# Patient Record
Sex: FEMALE | Race: WHITE | Employment: OTHER | URBAN - METROPOLITAN AREA
[De-identification: names, ages, dates, MRNs, and addresses within clinical notes are randomized per-mention and may not be internally consistent; named-entity substitution may affect disease eponyms.]

---

## 2017-05-10 ENCOUNTER — OFFICE VISIT (OUTPATIENT)
Dept: CARDIOTHORACIC SURGERY | Age: 82
End: 2017-05-10

## 2017-05-10 VITALS
RESPIRATION RATE: 16 BRPM | HEART RATE: 79 BPM | HEIGHT: 66 IN | TEMPERATURE: 97.9 F | BODY MASS INDEX: 27.08 KG/M2 | SYSTOLIC BLOOD PRESSURE: 114 MMHG | WEIGHT: 168.5 LBS | OXYGEN SATURATION: 94 % | DIASTOLIC BLOOD PRESSURE: 58 MMHG

## 2017-05-10 DIAGNOSIS — Z95.1 S/P CABG (CORONARY ARTERY BYPASS GRAFT): ICD-10-CM

## 2017-05-10 DIAGNOSIS — E03.9 HYPOTHYROIDISM, UNSPECIFIED TYPE: ICD-10-CM

## 2017-05-10 DIAGNOSIS — I25.10 CAD S/P PERCUTANEOUS CORONARY ANGIOPLASTY: ICD-10-CM

## 2017-05-10 DIAGNOSIS — I10 HYPERTENSION, BENIGN: ICD-10-CM

## 2017-05-10 DIAGNOSIS — Z98.61 CAD S/P PERCUTANEOUS CORONARY ANGIOPLASTY: ICD-10-CM

## 2017-05-10 DIAGNOSIS — I35.0 AORTIC VALVE STENOSIS, UNSPECIFIED ETIOLOGY: Primary | ICD-10-CM

## 2017-05-10 DIAGNOSIS — I25.10 CORONARY ARTERY DISEASE INVOLVING NATIVE HEART WITHOUT ANGINA PECTORIS, UNSPECIFIED VESSEL OR LESION TYPE: ICD-10-CM

## 2017-05-10 RX ORDER — ACETAMINOPHEN 325 MG/1
650 TABLET ORAL
COMMUNITY

## 2017-05-10 RX ORDER — DILTIAZEM HYDROCHLORIDE EXTENDED-RELEASE TABLETS 180 MG/1
180 TABLET, EXTENDED RELEASE ORAL
COMMUNITY

## 2017-05-10 RX ORDER — ROSUVASTATIN CALCIUM 10 MG/1
40 TABLET, COATED ORAL
COMMUNITY

## 2017-05-10 RX ORDER — LEVOTHYROXINE SODIUM 125 UG/1
TABLET ORAL
COMMUNITY

## 2017-05-10 RX ORDER — POTASSIUM CHLORIDE 750 MG/1
20 TABLET, FILM COATED, EXTENDED RELEASE ORAL DAILY
COMMUNITY

## 2017-05-10 RX ORDER — FUROSEMIDE 20 MG/1
TABLET ORAL DAILY
COMMUNITY

## 2017-05-10 RX ORDER — LANOLIN ALCOHOL/MO/W.PET/CERES
500 CREAM (GRAM) TOPICAL
COMMUNITY

## 2017-05-10 RX ORDER — ISOSORBIDE MONONITRATE 30 MG/1
30 TABLET, EXTENDED RELEASE ORAL
COMMUNITY
End: 2017-06-26

## 2017-05-10 RX ORDER — GLUCOSAMINE/CHONDR SU A SOD 750-600 MG
1 TABLET ORAL
COMMUNITY

## 2017-05-10 RX ORDER — GLUCOSAMINE SULFATE 1500 MG
1000 POWDER IN PACKET (EA) ORAL
COMMUNITY

## 2017-05-10 RX ORDER — WARFARIN 7.5 MG/1
5 TABLET ORAL DAILY
COMMUNITY

## 2017-05-10 NOTE — PROGRESS NOTES
Pt seen and examined with Leatha Vidales, NP  Cath and echo reviewed  She has symx AS and a high risk surgical candidate  Will schedule CTA

## 2017-05-10 NOTE — PROGRESS NOTES
Patient: Sravanthi Marquez   Age: 80 y.o. Patient Care Team:  Leonel Layton MD as PCP - General (Internal Medicine)  Rojas Mitchell MD as Referring Provider (Internal Medicine)  Sam Crane MD as Surgeon (Cardiothoracic Surgery)      PCP: Leonel Layton MD    Cardiologist: David Still     Diagnosis/Reason for Consultation: The primary encounter diagnosis was Aortic valve stenosis, unspecified etiology. Diagnoses of Hypertension, benign, Hypothyroidism, unspecified type, Coronary artery disease involving native heart without angina pectoris, unspecified vessel or lesion type, S/P CABG (coronary artery bypass graft), and CAD S/P percutaneous coronary angioplasty were also pertinent to this visit. Problem List:   Patient Active Problem List   Diagnosis Code    Acute MI, subendocardial, initial episode of care ([facility] [address] I21.4    Unstable angina ([facility] [address] I20.0    ASHD (arteriosclerotic heart disease) I25.10    Hypertension, benign I10    ASO (arteriosclerosis obliterans) I70.90    Hypercholesteremia E78.00    Hypothyroidism E03.9    Aortic stenosis, mild I35.0         HPI: 80 y.o.  female with PMHx of AS, rheumatic fever, MI (2012), HTN, HLD, PVD, Afib (warfarin), CAD s/p CABG (1994 by Dr. Sai Nelson) s/p multiple PCI multiple (MARIE to SVG-L Cx 8/2011, MARIE to SVG-Diag graft 10/2014), Carotid stensios s/p R CEA (1996), PAD with intermittent claudication, CVA s/p cerebellar infarcts, Hypothyroid, Nephrolithiasis, recurrent UTIs, past tobacco abuse (smoked for 2 yrs and quit 40 yrs ago) that is referred to the [address] [place] [address] by Dr. David Still for interventional evaluation of her AS. Ms. Leonor Navarro has been followed by Dr. David Still for several years and has most recently has been hospitalized at [facility] in [facility] (4/2017) for CHF, recurrent exertional CP with minimal activity, worsening SOB/HARRISON and dizziness.   She underwent TTE, cardiac cath and was discharged with a BB & an increased diuretic dosing from prn to daily. Previously, Ms. Artemio Christine has been hospitalized for CHF in Jan 2015 and in Feb 2016. Today Ms. Marquez reports persistent SOB/HARRISON with modest activity. She still has occasional exertional CP and arm pain. She also has some general extremity weakness and mild LE edema that she says is improved on her daily diuretic regimen. She denies orthopnea, PND, syncope or falls. Ms. Artemio Christine lives with her daughter and is independent in the ADLs but her daughter assists with medication administration d/t her short term memory issues. NYHA Classification: III   Class III (Moderate): Marked limitation of physical activity. Comfortable at rest, but less than ordinary activity causes fatigue, palpitation, or dyspnea    Angina Classification: 0   Class 0: No symptoms     Past Medical History:   Diagnosis Date    CAD (coronary artery disease)     mi    Heart failure ([facility] [address]     Hypertension     Thyroid disease     hypo     Endocarditis: no  Pulmonary HTN:  yes Greater than 2/3 systemic: no  Immunocompromised/Steroids: no  Heart Failure Admission w/in past year:  yes  Heart Failure Admission w/in past 2 weeks: no  Liver Dz/Cirrhosis: no   If yes, MELD score:  N/A    Past Surgical History:   Procedure Laterality Date    ABDOMEN SURGERY PROC UNLISTED      kidney stone    CARDIAC SURG PROCEDURE UNLIST      cabg    VASCULAR SURGERY PROCEDURE UNLIST      carotiderecctomy      Social History   Substance Use Topics    Smoking status: Never Smoker    Smokeless tobacco: Never Used    Alcohol use No      No family history on file. Prior to Admission medications    Medication Sig Start Date End Date Taking? Authorizing Provider   Biotin 2,500 mcg cap Take 1 Cap by mouth nightly. Yes Historical Provider   cholecalciferol (VITAMIN D3) 1,000 unit cap Take 1,000 Units by mouth nightly.    Yes Historical Provider   cyanocobalamin (VITAMIN B12) 500 mcg tablet Take 500 mcg by mouth nightly. Yes Historical Provider   dilTIAZem ER (CARDIZEM LA) 180 mg Tb24 tablet Take 180 mg by mouth nightly. Yes Historical Provider   isosorbide mononitrate ER (IMDUR) 30 mg tablet Take 30 mg by mouth nightly. Yes Historical Provider   potassium chloride SR (KLOR-CON 10) 10 mEq tablet Take 20 mEq by mouth daily. Yes Historical Provider   acetaminophen (TYLENOL) 325 mg tablet Take 650 mg by mouth every four (4) hours as needed for Pain. Yes Historical Provider   furosemide (LASIX) 20 mg tablet Take  by mouth daily. Yes Historical Provider   levothyroxine (SYNTHROID) 125 mcg tablet Take  by mouth Daily (before breakfast). Yes Historical Provider   rosuvastatin (CRESTOR) 10 mg tablet Take 40 mg by mouth nightly. Yes Historical Provider   warfarin (COUMADIN) 7.5 mg tablet Take 7.5 mg by mouth daily. As directed   Yes Historical Provider   sertraline (ZOLOFT) 50 mg tablet TAKE ONE TABLET BY MOUTH EVERY DAY 12/6/12  Yes Rhonda Agrawal MD   nitroglycerin (NITROSTAT) 0.4 mg SL tablet 1 Tab by SubLINGual route as needed for Chest Pain. 4/10/12  Yes Vivek Dugan MD   aspirin delayed-release 81 mg tablet Take 81 mg by mouth daily. Yes Historical Provider   atenolol (TENORMIN) 25 mg tablet Take 25 mg by mouth daily. Yes Historical Provider       Allergies   Allergen Reactions    Levofloxacin Swelling    Codeine Unable to Obtain     makes patient cry    Contrast Agent [Iodine] Unknown (comments)     Dr. Fabian Starr told patient that she is allergic       Current Medications:   Current Outpatient Prescriptions   Medication Sig Dispense Refill    Biotin 2,500 mcg cap Take 1 Cap by mouth nightly.  cholecalciferol (VITAMIN D3) 1,000 unit cap Take 1,000 Units by mouth nightly.  cyanocobalamin (VITAMIN B12) 500 mcg tablet Take 500 mcg by mouth nightly.  dilTIAZem ER (CARDIZEM LA) 180 mg Tb24 tablet Take 180 mg by mouth nightly.       isosorbide mononitrate ER (IMDUR) 30 mg tablet Take 30 mg by mouth nightly.  potassium chloride SR (KLOR-CON 10) 10 mEq tablet Take 20 mEq by mouth daily.  acetaminophen (TYLENOL) 325 mg tablet Take 650 mg by mouth every four (4) hours as needed for Pain.  furosemide (LASIX) 20 mg tablet Take  by mouth daily.  levothyroxine (SYNTHROID) 125 mcg tablet Take  by mouth Daily (before breakfast).  rosuvastatin (CRESTOR) 10 mg tablet Take 40 mg by mouth nightly.  warfarin (COUMADIN) 7.5 mg tablet Take 7.5 mg by mouth daily. As directed      sertraline (ZOLOFT) 50 mg tablet TAKE ONE TABLET BY MOUTH EVERY DAY 90 Tab 1    nitroglycerin (NITROSTAT) 0.4 mg SL tablet 1 Tab by SubLINGual route as needed for Chest Pain. 1 Bottle prn    aspirin delayed-release 81 mg tablet Take 81 mg by mouth daily.  atenolol (TENORMIN) 25 mg tablet Take 25 mg by mouth daily. Vitals: Blood pressure 114/58, pulse 79, temperature 97.9 °F (36.6 °C), temperature source Oral, resp. rate 16, height 5' 6\" (1.676 m), weight 168 lb 8 oz (76.4 kg), SpO2 94 %. Allergies: is allergic to levofloxacin; codeine; and contrast agent [iodine].     Review of Systems: Pertinent Positives per HPI   [x]Total of 13 systems reviewed as follows:  Constitutional: Negative fever, negative chills  Eyes:   Negative for amauroses fugax  ENT:   Negative sore throat,oral absecess  Endocrine Negative for goiter; DM  Respiratory:  Negative chronic cough,sputum production  Cards:   Negative for palpitations, varicosities  GI:   Negative for dysphagia, bleeding, nausea, vomiting, diarrhea, and abdominal pain  Genitourinary: Negative for frequency, dysuria  Integument:  Negative for rash and pruritus  Hematologic:  Negative for easy bruising; bleeding dyscarsia  Musculoskel: Negative for muscle weakness inhibiting ambulation  Neurological:  Negative for TIA, syncope, dizziness  Behavl/Psych: Negative for feelings of anxiety, depression     Cardiovascular Testing:   EKG: none    TTE 2/8/2017: Severe AS with CAROLYNN 0.86cm2, meanPG 36 mmHg, peakPG 71 mmHg,mild AI, central jet of mod MR, 60% LVEF with akinesis of the basal inferolateral wall, mod pulm HTN, mod TR with estimated PASP 63 mmHg, severely dilated LA    L/R Cardiac catheterization 4/17/2017: 3 vessel CAD; Patent LIMA -> LAD, SVG -> OMB with 80% stenosis, SVG-> RCA with . AS with CAROLYNN 0.87cm2, mildly elevated PCWP, mild pulmonary HTN, RA hypoxia, abdominal aortic aneurysm, mod-severe peripheral dz involving common iliac arteries. Heavy calcification in distal abdominal aorta, iliac arteries and femoral arteries. The infrarenal abdominal aorta has an AAA with lumen diameter approx 4.5 cm. R iliac at least 50% stenosis in the external iliac artery. L common iliac w/ 50% stenosis   Ao Pressure: 125/33 mmHg  LV pressure: 162/13 mmHg  AV gradient 33 mmHg  CO 2.85L/m by thermodilution and 4.7 L/m by Misbah  RAP: 8 mmHg  RV: 38/18 mmHg  PA: 41/12/23  PCWP: 14  SVC oxygenation saturation 56%  PA sat 51%  Aortic oxygen saturation 86%    Physical Exam:  General: Well nourished well groomed elderly woman appearing stated age accompanied by daughter  Neuro: A&OX3. LOPEZ. PERRL. [place]. Slow steady assisted gait with cane  Head:Normocephalic. Atraumatic. Symmetrical  Neck: Trachea Midline  Resp: CTA B. No Adv BS/cough/sputum/tachypnea with seated conversation  CV: S1S2 RRR. RUFUS IV/VI. No JVD/carotid bruits. Pink/warm/dry extremities. Moderate LE peripheral edema  GI:Benign ab. Soft. NT/ND. Active BS  : Voids  Integ: No obvious s/s of infection or breakdown  Musculo/Skeletal: FROM in all major joints. Good muscle tone    Clinic Evaluation:   KCCQ-12: scanned into EMR    5 meter gait: 12.30 seconds    Frality Survey:  Romana Cindy Index ADL - 6/6  scanned into EMR     Assessment/Plan:   1. AS - severe and symptomatic. Appears high risk surgical candidate. Needs Gated C/A/P CTA, PFT, & Carotids to further eval interventional options/approaches.  She has a dye allergy - given prophylax prescriptions. Will set up for TAVR TUesday testing and then back with us for dx review, meet with Amy Alejandra & Modesta and interventional plan. 2. Further plan/care by Amy Springer

## 2017-05-16 ENCOUNTER — HOSPITAL ENCOUNTER (OUTPATIENT)
Age: 82
Discharge: HOME | End: 2017-05-16
Attending: THORACIC SURGERY (CARDIOTHORACIC VASCULAR SURGERY) | Admitting: THORACIC SURGERY (CARDIOTHORACIC VASCULAR SURGERY)
Payer: MEDICARE

## 2017-05-16 ENCOUNTER — APPOINTMENT (OUTPATIENT)
Dept: PULMONOLOGY | Age: 82
End: 2017-05-16
Attending: NURSE PRACTITIONER
Payer: MEDICARE

## 2017-05-16 ENCOUNTER — APPOINTMENT (OUTPATIENT)
Dept: CT IMAGING | Age: 82
End: 2017-05-16
Attending: NURSE PRACTITIONER
Payer: MEDICARE

## 2017-05-16 ENCOUNTER — APPOINTMENT (OUTPATIENT)
Dept: VASCULAR SURGERY | Age: 82
End: 2017-05-16
Attending: NURSE PRACTITIONER
Payer: MEDICARE

## 2017-05-16 VITALS
OXYGEN SATURATION: 96 % | HEART RATE: 87 BPM | DIASTOLIC BLOOD PRESSURE: 73 MMHG | SYSTOLIC BLOOD PRESSURE: 128 MMHG | TEMPERATURE: 97.6 F | WEIGHT: 165.79 LBS | HEIGHT: 66 IN | BODY MASS INDEX: 26.64 KG/M2 | RESPIRATION RATE: 15 BRPM

## 2017-05-16 DIAGNOSIS — Z98.61 CAD S/P PERCUTANEOUS CORONARY ANGIOPLASTY: ICD-10-CM

## 2017-05-16 DIAGNOSIS — I25.10 CAD S/P PERCUTANEOUS CORONARY ANGIOPLASTY: ICD-10-CM

## 2017-05-16 DIAGNOSIS — I25.10 CORONARY ARTERY DISEASE INVOLVING NATIVE HEART WITHOUT ANGINA PECTORIS, UNSPECIFIED VESSEL OR LESION TYPE: ICD-10-CM

## 2017-05-16 DIAGNOSIS — I10 HYPERTENSION, BENIGN: ICD-10-CM

## 2017-05-16 DIAGNOSIS — I35.0 AORTIC VALVE STENOSIS, UNSPECIFIED ETIOLOGY: ICD-10-CM

## 2017-05-16 DIAGNOSIS — Z95.1 S/P CABG (CORONARY ARTERY BYPASS GRAFT): ICD-10-CM

## 2017-05-16 DIAGNOSIS — E03.9 HYPOTHYROIDISM, UNSPECIFIED TYPE: ICD-10-CM

## 2017-05-16 LAB
ALBUMIN SERPL BCP-MCNC: 4.1 G/DL (ref 3.5–5)
ALBUMIN/GLOB SERPL: 1.2 {RATIO} (ref 1.1–2.2)
ALP SERPL-CCNC: 90 U/L (ref 45–117)
ALT SERPL-CCNC: 23 U/L (ref 12–78)
ANION GAP BLD CALC-SCNC: 10 MMOL/L (ref 5–15)
AST SERPL W P-5'-P-CCNC: 19 U/L (ref 15–37)
BASOPHILS # BLD AUTO: 0 K/UL (ref 0–0.1)
BASOPHILS # BLD: 0 % (ref 0–1)
BILIRUB SERPL-MCNC: 0.6 MG/DL (ref 0.2–1)
BUN SERPL-MCNC: 33 MG/DL (ref 6–20)
BUN/CREAT SERPL: 30 (ref 12–20)
CALCIUM SERPL-MCNC: 9.7 MG/DL (ref 8.5–10.1)
CHLORIDE SERPL-SCNC: 104 MMOL/L (ref 97–108)
CO2 SERPL-SCNC: 26 MMOL/L (ref 21–32)
CREAT SERPL-MCNC: 1.11 MG/DL (ref 0.55–1.02)
DIFFERENTIAL METHOD BLD: ABNORMAL
EOSINOPHIL # BLD: 0 K/UL (ref 0–0.4)
EOSINOPHIL NFR BLD: 0 % (ref 0–7)
ERYTHROCYTE [DISTWIDTH] IN BLOOD BY AUTOMATED COUNT: 17.6 % (ref 11.5–14.5)
GLOBULIN SER CALC-MCNC: 3.4 G/DL (ref 2–4)
GLUCOSE SERPL-MCNC: 169 MG/DL (ref 65–100)
HCT VFR BLD AUTO: 38.8 % (ref 35–47)
HGB BLD-MCNC: 12.5 G/DL (ref 11.5–16)
LYMPHOCYTES # BLD AUTO: 13 % (ref 12–49)
LYMPHOCYTES # BLD: 0.7 K/UL (ref 0.8–3.5)
MCH RBC QN AUTO: 25.8 PG (ref 26–34)
MCHC RBC AUTO-ENTMCNC: 32.2 G/DL (ref 30–36.5)
MCV RBC AUTO: 80 FL (ref 80–99)
MONOCYTES # BLD: 0.1 K/UL (ref 0–1)
MONOCYTES NFR BLD AUTO: 1 % (ref 5–13)
NEUTS SEG # BLD: 4.4 K/UL (ref 1.8–8)
NEUTS SEG NFR BLD AUTO: 86 % (ref 32–75)
PLATELET # BLD AUTO: 152 K/UL (ref 150–400)
POTASSIUM SERPL-SCNC: 3.9 MMOL/L (ref 3.5–5.1)
PROT SERPL-MCNC: 7.5 G/DL (ref 6.4–8.2)
RBC # BLD AUTO: 4.85 M/UL (ref 3.8–5.2)
RBC MORPH BLD: ABNORMAL
SODIUM SERPL-SCNC: 140 MMOL/L (ref 136–145)
WBC # BLD AUTO: 5.2 K/UL (ref 3.6–11)

## 2017-05-16 PROCEDURE — 74011250637 HC RX REV CODE- 250/637: Performed by: NURSE PRACTITIONER

## 2017-05-16 PROCEDURE — 74011636320 HC RX REV CODE- 636/320: Performed by: THORACIC SURGERY (CARDIOTHORACIC VASCULAR SURGERY)

## 2017-05-16 PROCEDURE — 71275 CT ANGIOGRAPHY CHEST: CPT

## 2017-05-16 PROCEDURE — 94010 BREATHING CAPACITY TEST: CPT

## 2017-05-16 PROCEDURE — 36415 COLL VENOUS BLD VENIPUNCTURE: CPT | Performed by: NURSE PRACTITIONER

## 2017-05-16 PROCEDURE — 85025 COMPLETE CBC W/AUTO DIFF WBC: CPT | Performed by: NURSE PRACTITIONER

## 2017-05-16 PROCEDURE — 93880 EXTRACRANIAL BILAT STUDY: CPT

## 2017-05-16 PROCEDURE — 99218 HC RM OBSERVATION: CPT

## 2017-05-16 PROCEDURE — 80053 COMPREHEN METABOLIC PANEL: CPT | Performed by: NURSE PRACTITIONER

## 2017-05-16 PROCEDURE — 74174 CTA ABD&PLVS W/CONTRAST: CPT

## 2017-05-16 RX ORDER — SODIUM CHLORIDE 0.9 % (FLUSH) 0.9 %
SYRINGE (ML) INJECTION
Status: DISCONTINUED
Start: 2017-05-16 | End: 2017-05-16 | Stop reason: HOSPADM

## 2017-05-16 RX ORDER — SODIUM CHLORIDE 0.9 % (FLUSH) 0.9 %
10 SYRINGE (ML) INJECTION
Status: DISCONTINUED | OUTPATIENT
Start: 2017-05-16 | End: 2017-05-16 | Stop reason: HOSPADM

## 2017-05-16 RX ORDER — DIPHENHYDRAMINE HCL 25 MG
50 CAPSULE ORAL ONCE
Status: COMPLETED | OUTPATIENT
Start: 2017-05-16 | End: 2017-05-16

## 2017-05-16 RX ORDER — SODIUM CHLORIDE 9 MG/ML
INJECTION, SOLUTION INTRAVENOUS
Status: DISCONTINUED
Start: 2017-05-16 | End: 2017-05-16 | Stop reason: HOSPADM

## 2017-05-16 RX ORDER — SODIUM CHLORIDE 0.9 % (FLUSH) 0.9 %
10 SYRINGE (ML) INJECTION
Status: DISCONTINUED | OUTPATIENT
Start: 2017-05-16 | End: 2017-05-16 | Stop reason: SDUPTHER

## 2017-05-16 RX ADMIN — IOPAMIDOL 100 ML: 755 INJECTION, SOLUTION INTRAVENOUS at 12:21

## 2017-05-16 RX ADMIN — DIPHENHYDRAMINE HYDROCHLORIDE 50 MG: 25 CAPSULE ORAL at 11:05

## 2017-05-16 NOTE — PROGRESS NOTES
0900: pt arrived to [facility] with daughter. vss and wt obtained. 5585: iv placed and labs sent. 3006: off floor to testing.  1305: pt returned to unit all testing completed. Iv discontinued.

## 2017-05-16 NOTE — PROCEDURES
[facility]  *** FINAL REPORT ***    Name: Trinity Kang  MRN: [id number]    Outpatient  : 01 Dec 1932  HIS Order #: [id number]  [address] [facility] Visit #: [id number]  Date: 16 May 2017    TYPE OF TEST: Cerebrovascular Duplex    REASON FOR TEST  Pre-op Tavr    Right Carotid:-             Proximal               Mid                 Distal  cm/s  Systolic  Diastolic  Systolic  Diastolic  Systolic  Diastolic  CCA:     26.0       9.0                            57.0       8.0  Bulb:  ECA:    100.0       0.0  ICA:     63.0      14.0                            67.0      10.0  ICA/CCA:  1.1       1.8    ICA Stenosis: <50%    Right Vertebral:-  Finding: Antegrade  Sys:       43.0  Imani:        9.0    Right Subclavian:    Left Carotid:-            Proximal                Mid                 Distal  cm/s  Systolic  Diastolic  Systolic  Diastolic  Systolic  Diastolic  CCA:     83.3       7.0                            61.0      10.0  Bulb:  ECA:    161.0       0.0  ICA:    155.0      35.0                            54.0      15.0  ICA/CCA:  2.5       3.5    ICA Stenosis: 50-69%    Left Vertebral:-  Finding: Antegrade  Sys:       41.0  Imani:        0.0    Left Subclavian:    INTERPRETATION/FINDINGS  PROCEDURE:  Color duplex ultrasound imaging of extracranial  cerebrovascular arteries. FINDINGS:       Right:  Internal carotid velocity is within normal limits. There   is narrowing of the flow channel on color Doppler imaging and mixed  density plaque on B-mode imaging, consistent with less than 50 percent   stenosis. The common and external carotid arteries are patent and  without evidence of hemodynamically significant stenosis. Left:  Internal carotid velocity is elevated to a level  consistent with a 50 to 69 percent stenosis; there is narrowing of the   flow channel on color Doppler imaging and mixed density plaque on  B-mode imaging.   The common carotid artery is patent without evidence  of hemodynamically significant stenosis. The external carotid artery  is narrowed with evidence of hemodynamically significant stenosis. IMPRESSION:  Consistent with less than 50% stenosis of the right  internal carotid and 50-69% stenosis of the left internal carotid. Vertebrals are patent with antegrade flow. ADDITIONAL COMMENTS    I have personally reviewed the data relevant to the interpretation of  this  study.     TECHNOLOGIST: DEISY Ruiz RCS  Signed: 05/16/2017 12:31 PM    PHYSICIAN: Adele Silver MD  Signed: 05/17/2017 08:18 AM

## 2017-05-17 NOTE — PROCEDURES
[address]   [address] [facility], [address] [place] [address]   PULMONARY FUNCTION       Name:  Marcell Duong   MR#:  [id number]   :  1932   Account #:  [de-identified]    Date of Procedure:  2017   Date of Adm:  2017       REQUESTING PHYSICIAN: Dr. Marisel Reyes. INDICATION: Aortic valve disease, I35.0.      Spirometry reveals a normal vital capacity, normal FEV1, and a normal   ratio and flow loops are normal.         MD Sheron Wyatt / Carmel Larios   D:  2017   09:50   T:  2017   11:04   Job #:  [id number]

## 2017-05-31 ENCOUNTER — OFFICE VISIT (OUTPATIENT)
Dept: CARDIOTHORACIC SURGERY | Age: 82
End: 2017-05-31

## 2017-05-31 VITALS
BODY MASS INDEX: 27.08 KG/M2 | HEIGHT: 66 IN | OXYGEN SATURATION: 95 % | WEIGHT: 168.5 LBS | RESPIRATION RATE: 16 BRPM | DIASTOLIC BLOOD PRESSURE: 62 MMHG | HEART RATE: 74 BPM | SYSTOLIC BLOOD PRESSURE: 108 MMHG | TEMPERATURE: 98.1 F

## 2017-05-31 DIAGNOSIS — E03.9 HYPOTHYROIDISM, UNSPECIFIED TYPE: ICD-10-CM

## 2017-05-31 DIAGNOSIS — I35.0 AORTIC VALVE STENOSIS, UNSPECIFIED ETIOLOGY: Primary | ICD-10-CM

## 2017-05-31 DIAGNOSIS — I25.10 CORONARY ARTERY DISEASE INVOLVING NATIVE HEART WITHOUT ANGINA PECTORIS, UNSPECIFIED VESSEL OR LESION TYPE: ICD-10-CM

## 2017-05-31 DIAGNOSIS — Z98.61 CAD S/P PERCUTANEOUS CORONARY ANGIOPLASTY: ICD-10-CM

## 2017-05-31 DIAGNOSIS — Z95.1 S/P CABG (CORONARY ARTERY BYPASS GRAFT): ICD-10-CM

## 2017-05-31 DIAGNOSIS — I25.10 CAD S/P PERCUTANEOUS CORONARY ANGIOPLASTY: ICD-10-CM

## 2017-05-31 DIAGNOSIS — I10 HYPERTENSION, BENIGN: ICD-10-CM

## 2017-05-31 RX ORDER — CEPHALEXIN 500 MG/1
500 CAPSULE ORAL 2 TIMES DAILY
COMMUNITY
End: 2017-06-07 | Stop reason: SDUPTHER

## 2017-05-31 NOTE — PROGRESS NOTES
Patient: Franco Swain   Age: 80 y.o. Patient Care Team:  Jada Murillo MD as PCP - General (Internal Medicine)  Ashanti Siegel MD as Referring Provider (Internal Medicine)  Kiel Owen MD as Surgeon (Cardiothoracic Surgery)      PCP: Jada Murillo MD    Cardiologist: Claudene Nay    Diagnosis/Reason for Consultation: The primary encounter diagnosis was Aortic valve stenosis, unspecified etiology. Diagnoses of Hypertension, benign, Hypothyroidism, unspecified type, Coronary artery disease involving native heart without angina pectoris, unspecified vessel or lesion type, S/P CABG (coronary artery bypass graft), and CAD S/P percutaneous coronary angioplasty were also pertinent to this visit. Problem List:   Patient Active Problem List   Diagnosis Code    Acute MI, subendocardial, initial episode of care ([facility] [address] I21.4    Unstable angina ([facility] [address] I20.0    ASHD (arteriosclerotic heart disease) I25.10    Hypertension, benign I10    ASO (arteriosclerosis obliterans) I70.90    Hypercholesteremia E78.00    Hypothyroidism E03.9    Aortic stenosis, mild I35.0      HPI: 80 y.o.  female with PMHx of AS, rheumatic fever, MI (2012), HTN, HLD, PVD, Afib (warfarin), CAD s/p CABG (1994 by Dr. Rachel Calderon) s/p multiple PCI multiple (MARIE to SVG-L Cx 8/2011, MARIE to SVG-Diag graft 10/2014), Carotid stensios s/p R CEA (1996), PAD with intermittent claudication, s/p L subclavian stent, CVA s/p cerebellar infarcts, Hypothyroid, Nephrolithiasis, recurrent UTIs, past tobacco abuse (smoked for 2 yrs and quit 40 yrs ago) that was referred to the [address] [place] [address] by Dr. Claudene Nay for interventional evaluation of her AS. She is back today to review her dx testing and discuss interventional options.     Ms. Marquez has been followed by Dr. Claudene Nay for several years and has most recently has been hospitalized at [place] in [place] (4/2017) for CHF, recurrent exertional CP with minimal activity, worsening SOB/HARRISON and dizziness. She underwent TTE, cardiac cath and was discharged with a BB & an increased diuretic dosing from prn to daily. Previously, Ms. Alvina Stapleton has been hospitalized for CHF in Jan 2015 and in Feb 2016.      Today Ms. Marquez reports persistent SOB/HARRISON with modest activity. Denies CP. Lightheadedness most all of the time since first cardiac surgery. No syncope. Last fall 3/28/2017 with fast turn. No LOC. LE edema improved on daily diuretic. Three pillows for orthopnea. No PND. Wakes 2-3 times/nightly to urinate.      Ms. Marquez lives with her daughter and is independent in the ADLs but her daughter assists with medication administration d/t her short term memory issues. She also uses a cane to assist with mobility.     NYHA Classification: III  Class III (Moderate): Marked limitation of physical activity. Comfortable at rest, but less than ordinary activity causes fatigue, palpitation, or dyspnea     Angina Classification: 0   Class 0: No symptoms    Past Medical History:   Diagnosis Date    CAD (coronary artery disease)     mi    Heart failure ([facility] [address]     Hypertension     Thyroid disease     hypo     Endocarditis: no  Pulmonary HTN: yes  Greater than 2/3 systemic: no  Immunocompromised/Steroids: no  Heart Failure Admission w/in past year:  yes  Heart Failure Admission w/in past 2 weeks: no  Liver Dz/Cirrhosis: no If yes, MELD score: N/A    Past Surgical History:   Procedure Laterality Date    ABDOMEN SURGERY PROC UNLISTED      kidney stone    CARDIAC SURG PROCEDURE UNLIST      cabg    VASCULAR SURGERY PROCEDURE UNLIST      carotiderecctomy      Social History   Substance Use Topics    Smoking status: Never Smoker    Smokeless tobacco: Never Used    Alcohol use No      No family history on file. Prior to Admission medications    Medication Sig Start Date End Date Taking? Authorizing Provider   cephALEXin (KEFLEX) 500 mg capsule Take 500 mg by mouth two (2) times a day. Indications: BACTERIAL URINARY TRACT INFECTION   Yes Historical Provider   Biotin 2,500 mcg cap Take 1 Cap by mouth nightly. Yes Historical Provider   cholecalciferol (VITAMIN D3) 1,000 unit cap Take 1,000 Units by mouth nightly. Yes Historical Provider   cyanocobalamin (VITAMIN B12) 500 mcg tablet Take 500 mcg by mouth nightly. Yes Historical Provider   dilTIAZem ER (CARDIZEM LA) 180 mg Tb24 tablet Take 180 mg by mouth nightly. Yes Historical Provider   isosorbide mononitrate ER (IMDUR) 30 mg tablet Take 30 mg by mouth nightly. Yes Historical Provider   potassium chloride SR (KLOR-CON 10) 10 mEq tablet Take 20 mEq by mouth daily. Yes Historical Provider   acetaminophen (TYLENOL) 325 mg tablet Take 650 mg by mouth every four (4) hours as needed for Pain. Yes Historical Provider   furosemide (LASIX) 20 mg tablet Take  by mouth daily. Yes Historical Provider   levothyroxine (SYNTHROID) 125 mcg tablet Take  by mouth Daily (before breakfast). Yes Historical Provider   rosuvastatin (CRESTOR) 10 mg tablet Take 40 mg by mouth nightly. Yes Historical Provider   warfarin (COUMADIN) 7.5 mg tablet Take 7.5 mg by mouth daily. As directed   Yes Historical Provider   sertraline (ZOLOFT) 50 mg tablet TAKE ONE TABLET BY MOUTH EVERY DAY 12/6/12  Yes Sage Solomon MD   nitroglycerin (NITROSTAT) 0.4 mg SL tablet 1 Tab by SubLINGual route as needed for Chest Pain. 4/10/12  Yes Vince Dewey MD   aspirin delayed-release 81 mg tablet Take 81 mg by mouth daily. Yes Historical Provider   atenolol (TENORMIN) 25 mg tablet Take 25 mg by mouth daily.    Yes Historical Provider       Allergies   Allergen Reactions    Levofloxacin Swelling    Codeine Unable to Obtain     makes patient cry    Contrast Agent [Iodine] Unknown (comments)     Dr. Harry Whipple told patient that she is allergic       Current Medications:   Current Outpatient Prescriptions   Medication Sig Dispense Refill    cephALEXin (KEFLEX) 500 mg capsule Take 500 mg by mouth two (2) times a day. Indications: BACTERIAL URINARY TRACT INFECTION      Biotin 2,500 mcg cap Take 1 Cap by mouth nightly.  cholecalciferol (VITAMIN D3) 1,000 unit cap Take 1,000 Units by mouth nightly.  cyanocobalamin (VITAMIN B12) 500 mcg tablet Take 500 mcg by mouth nightly.  dilTIAZem ER (CARDIZEM LA) 180 mg Tb24 tablet Take 180 mg by mouth nightly.  isosorbide mononitrate ER (IMDUR) 30 mg tablet Take 30 mg by mouth nightly.  potassium chloride SR (KLOR-CON 10) 10 mEq tablet Take 20 mEq by mouth daily.  acetaminophen (TYLENOL) 325 mg tablet Take 650 mg by mouth every four (4) hours as needed for Pain.  furosemide (LASIX) 20 mg tablet Take  by mouth daily.  levothyroxine (SYNTHROID) 125 mcg tablet Take  by mouth Daily (before breakfast).  rosuvastatin (CRESTOR) 10 mg tablet Take 40 mg by mouth nightly.  warfarin (COUMADIN) 7.5 mg tablet Take 7.5 mg by mouth daily. As directed      sertraline (ZOLOFT) 50 mg tablet TAKE ONE TABLET BY MOUTH EVERY DAY 90 Tab 1    nitroglycerin (NITROSTAT) 0.4 mg SL tablet 1 Tab by SubLINGual route as needed for Chest Pain. 1 Bottle prn    aspirin delayed-release 81 mg tablet Take 81 mg by mouth daily.  atenolol (TENORMIN) 25 mg tablet Take 25 mg by mouth daily. Vitals: Blood pressure 108/62, pulse 74, temperature 98.1 °F (36.7 °C), temperature source Oral, resp. rate 16, height 5' 6\" (1.676 m), weight 168 lb 8 oz (76.4 kg), SpO2 95 %. Allergies: is allergic to levofloxacin; codeine; and contrast agent [iodine].     Review of Systems: Pertinent Positives per HPI  [x]Total of 13 systems reviewed as follows:  Constitutional: Negative fever, negative chills  Eyes:   Negative for amauroses fugax  ENT:   Negative sore throat,oral absecess  Endocrine Negative for goiter; DM  Respiratory:  Negative chronic cough,sputum production  Cards:   Negative for palpitations, varicosities  GI:   Negative for dysphagia, bleeding, nausea, vomiting, diarrhea, and abdominal pain  Genitourinary: Negative for frequency, dysuria  Integument:  Negative for rash and pruritus  Hematologic:  Negative for easy bruising; bleeding dyscarsia  Musculoskel: Negative for muscle weakness inhibiting ambulation  Neurological:  Negative for TIA, syncope, dizziness  Behavl/Psych: Negative for feelings of anxiety, depression      Cardiovascular Testing:   EKG today: Afib 82 bpm. Diffuse ST depressin     TTE 2/8/2017: Severe AS with CAROLYNN 0.86cm2, meanPG 36 mmHg, peakPG 71 mmHg,mild AI, central jet of mod MR, 60% LVEF with akinesis of the basal inferolateral wall, mod pulm HTN, mod TR with estimated PASP 63 mmHg, severely dilated LA     L/R Cardiac catheterization 4/17/2017: 3 vessel CAD; Patent LIMA -> LAD, SVG -> OMB with 80% stenosis, SVG-> RCA with . AS with CAROLYNN 0.87cm2, mildly elevated PCWP, mild pulmonary HTN, RA hypoxia, abdominal aortic aneurysm, mod-severe peripheral dz involving common iliac arteries. Heavy calcification in distal abdominal aorta, iliac arteries and femoral arteries. The infrarenal abdominal aorta has an AAA with lumen diameter approx 4.5 cm. R iliac at least 50% stenosis in the external iliac artery. L common iliac w/ 50% stenosis   Ao Pressure: 125/33 mmHg   LV pressure: 162/13 mmHg  AV gradient 33 mmHg  CO 2.85L/m by thermodilution and 4.7 L/m by Misbah  RAP: 8 mmHg  RV: 38/18 mmHg  PA: 41/12/23  PCWP: 14   SVC oxygenation saturation 56%  PA sat 51%  Aortic oxygen saturation 86%    Carotid Dopplers 5/16/2017: Consistent with less than 50% stenosis of the right internal carotid and 50-69% stenosis of the left internal carotid. Vertebrals are patent with antegrade flow. PFTs: FEV1/Predicted 5/16/2017: 2.21L / 120% predicted   Normal FEV1 > 1 Liter, Predicted = 100%, DLCO > 80%     CTA 5/16/2017: THYROID: No nodule.  MEDIASTINUM: Prominent mediastinal lymph nodes are noted, with a reference 19 mm precarinal lymph node. JELLY: No mass or lymphadenopathy. THORACIC AORTA: The thoracic aorta is atherosclerotic and appearance without evidence of aneurysm. The patient is status post CABG procedure. Aortic valvular calcification is noted. The annulus measures 2.6 cm and the sinus measures 3.1 cm. There is a stent within the proximal left subclavian artery. MAIN PULMONARY ARTERY: Normal in caliber. TRACHEA/BRONCHI: Patent. ESOPHAGUS: No wall thickening or dilatation. HEART: Normal in size. PLEURA: There is a small right pleural effusion. LUNGS: Scarring is noted in the lung apices. Multiple calcified granulomata are noted. No acute abnormality is identified.   LIVER: Multiple hepatic cysts are noted, with a reference 3.3 cm cyst in the left hepatic lobe with septations. GALLBLADDER: Unremarkable. SPLEEN: The spleen is at the upper limits of normal in size measuring 2.4 cm. PANCREAS: There is an 11 mm lipoma in the tail of the pancreas. ADRENALS: Unremarkable. KIDNEYS: There is a 14 mm nonobstructing left renal stone. No renal mass or hydronephrosis. STOMACH: Unremarkable. SMALL BOWEL: No dilatation or wall thickening. COLON: No dilatation or wall thickening. APPENDIX: Unremarkable. PERITONEUM: No ascites or pneumoperitoneum. RETROPERITONEUM: There is a 4.2 cm infrarenal abdominal aortic aneurysm. The iliac vasculature is heavily calcified. The right common iliac artery measures 10 mm and the left measures 9. Common femoral arteries are also heavily calcified and measures 7 mm each. REPRODUCTIVE ORGANS: There is no pelvic mass or lymphadenopathy. URINARY BLADDER: No mass or calculus. BONES: Degenerative changes are seen in the thoracic and lumbar spine. IMPRESSION:  1. Granulomatous disease. 2. Prominent mediastinal lymph nodes. Attention on follow-up is recommended. 3. Extensive atherosclerotic vascular disease as described above. 4. Hepatic cysts including complex cyst in the left hepatic lobe.   5. 4.2 cm infrarenal abdominal aortic aneurysm.     Physical Exam:  General: Well nourished well groomed elderly woman appearing stated age accompanied by daughter  Neuro: A&OX3. LOPEZ. PERRL. [place]. Slow steady assisted gait with cane  Head:Normocephalic. Atraumatic. Symmetrical  Neck: Trachea Midline  Resp: CTA B. No Adv BS/cough/sputum/tachypnea with seated conversation  CV: S1S2 RRR. RUFUS IV/VI. No JVD/carotid bruits. Pink/warm/dry extremities. Moderate LE peripheral edema  GI:Benign ab. Soft. NT/ND. Active BS  : Voids  Integ: No obvious s/s of infection or breakdown  Musculo/Skeletal: FROM in all major joints. Good muscle tone     Clinic Evaluation:   KCCQ-12: scanned into EMR at previous visit     5 meter gait: 12.30 seconds     Frality Survey:  Teagan Pique Index ADL - 6/6 scanned into EMR at previous visit    STS 2.81 Risk Score / Predicted 30 day mortality: - calculations scanned into EMR   Procedure: AV Replacement    Risk of Mortality: 5.306%    Morbidity or Mortality: 29.532%    Long Length of Stay: 16.842%    Short Length of Stay: 11.074%    Permanent Stroke: 6.341%    Prolonged Ventilation: 19.366%    DSW Infection: 0.239%    Renal Failure: 6.59%    Reoperation: 11.353%    Assessment/Plan:   1. AS - severe and symptomatic. High risk surgical candidate with redo sternotomy,h/o CVA, mobility issues, porcelain aorta, & vasculopath. TA 23mm S3. Needs contrast allergy prophylax prescription meds to be given at time of H&P.  2. Further plan/care by Amy Garza 238

## 2017-05-31 NOTE — PROGRESS NOTES
Saw patient. History and films reviewed. Risks and benefits explained. Agrees with TAVR. Patient seen by valve clinic NP and agree with her assessment.    Findings/Conditions: AS  Plan of Care: TAVR through trans-apical approach   Risk of morbidity and mortality - high  Medical decision making - high complexity

## 2017-05-31 NOTE — PROGRESS NOTES
I had the pleasure of seeing Ms. Marquez in the valve clinic earlier today with Ms. David Babcock NP - please see her note for details. She has severe symptomatic AS and is at high surgical risk. Her iliac vessels are not adequate for TF access, but would be a good TA candidate with a 23mm S3 valve (annulus 421mm2).

## 2017-06-07 ENCOUNTER — HOSPITAL ENCOUNTER (OUTPATIENT)
Dept: PREADMISSION TESTING | Age: 82
Discharge: HOME | End: 2017-06-07
Payer: MEDICARE

## 2017-06-07 ENCOUNTER — HOSPITAL ENCOUNTER (OUTPATIENT)
Dept: GENERAL RADIOLOGY | Age: 82
Discharge: HOME | End: 2017-06-07
Attending: NURSE PRACTITIONER
Payer: MEDICARE

## 2017-06-07 VITALS
SYSTOLIC BLOOD PRESSURE: 137 MMHG | DIASTOLIC BLOOD PRESSURE: 65 MMHG | WEIGHT: 168.65 LBS | BODY MASS INDEX: 27.22 KG/M2

## 2017-06-07 DIAGNOSIS — I35.0 NONRHEUMATIC AORTIC VALVE STENOSIS: Primary | ICD-10-CM

## 2017-06-07 LAB
ALBUMIN SERPL BCP-MCNC: 3.9 G/DL (ref 3.5–5)
ALBUMIN/GLOB SERPL: 1.1 {RATIO} (ref 1.1–2.2)
ALP SERPL-CCNC: 87 U/L (ref 45–117)
ALT SERPL-CCNC: 31 U/L (ref 12–78)
ANION GAP BLD CALC-SCNC: 7 MMOL/L (ref 5–15)
APPEARANCE UR: CLEAR
APTT PPP: 29.6 SEC (ref 22.1–32.5)
ARTERIAL PATENCY WRIST A: YES
AST SERPL W P-5'-P-CCNC: 20 U/L (ref 15–37)
ATRIAL RATE: 72 BPM
BACTERIA URNS QL MICRO: NEGATIVE /HPF
BASE EXCESS BLD CALC-SCNC: 0 MMOL/L
BASOPHILS # BLD AUTO: 0.1 K/UL (ref 0–0.1)
BASOPHILS # BLD: 1 % (ref 0–1)
BDY SITE: ABNORMAL
BILIRUB SERPL-MCNC: 0.7 MG/DL (ref 0.2–1)
BILIRUB UR QL: NEGATIVE
BUN SERPL-MCNC: 24 MG/DL (ref 6–20)
BUN/CREAT SERPL: 23 (ref 12–20)
CALCIUM SERPL-MCNC: 9.1 MG/DL (ref 8.5–10.1)
CALCULATED R AXIS, ECG10: 19 DEGREES
CALCULATED T AXIS, ECG11: -119 DEGREES
CHLORIDE SERPL-SCNC: 107 MMOL/L (ref 97–108)
CO2 SERPL-SCNC: 29 MMOL/L (ref 21–32)
COLOR UR: ABNORMAL
CREAT SERPL-MCNC: 1.04 MG/DL (ref 0.55–1.02)
DIAGNOSIS, 93000: NORMAL
DIFFERENTIAL METHOD BLD: ABNORMAL
EOSINOPHIL # BLD: 0.2 K/UL (ref 0–0.4)
EOSINOPHIL NFR BLD: 4 % (ref 0–7)
EPITH CASTS URNS QL MICRO: ABNORMAL /LPF
ERYTHROCYTE [DISTWIDTH] IN BLOOD BY AUTOMATED COUNT: 17.7 % (ref 11.5–14.5)
EST. AVERAGE GLUCOSE BLD GHB EST-MCNC: 123 MG/DL
GAS FLOW.O2 O2 DELIVERY SYS: ABNORMAL L/MIN
GLOBULIN SER CALC-MCNC: 3.4 G/DL (ref 2–4)
GLUCOSE SERPL-MCNC: 74 MG/DL (ref 65–100)
GLUCOSE UR STRIP.AUTO-MCNC: NEGATIVE MG/DL
HBA1C MFR BLD: 5.9 % (ref 4.2–6.3)
HCO3 BLD-SCNC: 23 MMOL/L (ref 22–26)
HCT VFR BLD AUTO: 36.9 % (ref 35–47)
HGB BLD-MCNC: 12 G/DL (ref 11.5–16)
HGB UR QL STRIP: ABNORMAL
HYALINE CASTS URNS QL MICRO: ABNORMAL /LPF (ref 0–5)
INR PPP: 1.3 (ref 0.9–1.1)
KETONES UR QL STRIP.AUTO: NEGATIVE MG/DL
LEUKOCYTE ESTERASE UR QL STRIP.AUTO: ABNORMAL
LYMPHOCYTES # BLD AUTO: 15 % (ref 12–49)
LYMPHOCYTES # BLD: 0.8 K/UL (ref 0.8–3.5)
MAGNESIUM SERPL-MCNC: 2.3 MG/DL (ref 1.6–2.4)
MCH RBC QN AUTO: 26.2 PG (ref 26–34)
MCHC RBC AUTO-ENTMCNC: 32.5 G/DL (ref 30–36.5)
MCV RBC AUTO: 80.6 FL (ref 80–99)
MONOCYTES # BLD: 0.4 K/UL (ref 0–1)
MONOCYTES NFR BLD AUTO: 8 % (ref 5–13)
NEUTS SEG # BLD: 3.6 K/UL (ref 1.8–8)
NEUTS SEG NFR BLD AUTO: 72 % (ref 32–75)
NITRITE UR QL STRIP.AUTO: NEGATIVE
PCO2 BLD: 30.6 MMHG (ref 35–45)
PH BLD: 7.49 [PH] (ref 7.35–7.45)
PH UR STRIP: 6 [PH] (ref 5–8)
PLATELET # BLD AUTO: 122 K/UL (ref 150–400)
PO2 BLD: 73 MMHG (ref 80–100)
POTASSIUM SERPL-SCNC: 3.3 MMOL/L (ref 3.5–5.1)
PROT SERPL-MCNC: 7.3 G/DL (ref 6.4–8.2)
PROT UR STRIP-MCNC: NEGATIVE MG/DL
PROTHROMBIN TIME: 12.9 SEC (ref 9–11.1)
Q-T INTERVAL, ECG07: 450 MS
QRS DURATION, ECG06: 118 MS
QTC CALCULATION (BEZET), ECG08: 495 MS
RBC # BLD AUTO: 4.58 M/UL (ref 3.8–5.2)
RBC #/AREA URNS HPF: ABNORMAL /HPF (ref 0–5)
RBC MORPH BLD: ABNORMAL
RBC MORPH BLD: ABNORMAL
SAO2 % BLD: 96 % (ref 92–97)
SODIUM SERPL-SCNC: 143 MMOL/L (ref 136–145)
SP GR UR REFRACTOMETRY: 1.01 (ref 1–1.03)
SPECIMEN TYPE: ABNORMAL
THERAPEUTIC RANGE,PTTT: NORMAL SECS (ref 58–77)
TSH SERPL DL<=0.05 MIU/L-ACNC: 1.26 UIU/ML (ref 0.36–3.74)
UA: UC IF INDICATED,UAUC: ABNORMAL
UROBILINOGEN UR QL STRIP.AUTO: 0.2 EU/DL (ref 0.2–1)
VENTRICULAR RATE, ECG03: 73 BPM
WBC # BLD AUTO: 5.1 K/UL (ref 3.6–11)
WBC URNS QL MICRO: ABNORMAL /HPF (ref 0–4)

## 2017-06-07 PROCEDURE — 85610 PROTHROMBIN TIME: CPT | Performed by: NURSE PRACTITIONER

## 2017-06-07 PROCEDURE — 81001 URINALYSIS AUTO W/SCOPE: CPT | Performed by: NURSE PRACTITIONER

## 2017-06-07 PROCEDURE — 84443 ASSAY THYROID STIM HORMONE: CPT | Performed by: NURSE PRACTITIONER

## 2017-06-07 PROCEDURE — 80053 COMPREHEN METABOLIC PANEL: CPT | Performed by: NURSE PRACTITIONER

## 2017-06-07 PROCEDURE — 83735 ASSAY OF MAGNESIUM: CPT | Performed by: NURSE PRACTITIONER

## 2017-06-07 PROCEDURE — 86900 BLOOD TYPING SEROLOGIC ABO: CPT | Performed by: NURSE PRACTITIONER

## 2017-06-07 PROCEDURE — 71020 XR CHEST PA LAT: CPT

## 2017-06-07 PROCEDURE — 36600 WITHDRAWAL OF ARTERIAL BLOOD: CPT

## 2017-06-07 PROCEDURE — 83036 HEMOGLOBIN GLYCOSYLATED A1C: CPT | Performed by: NURSE PRACTITIONER

## 2017-06-07 PROCEDURE — 85730 THROMBOPLASTIN TIME PARTIAL: CPT | Performed by: NURSE PRACTITIONER

## 2017-06-07 PROCEDURE — 93005 ELECTROCARDIOGRAM TRACING: CPT

## 2017-06-07 PROCEDURE — 36415 COLL VENOUS BLD VENIPUNCTURE: CPT | Performed by: NURSE PRACTITIONER

## 2017-06-07 PROCEDURE — 85025 COMPLETE CBC W/AUTO DIFF WBC: CPT | Performed by: NURSE PRACTITIONER

## 2017-06-07 PROCEDURE — 86923 COMPATIBILITY TEST ELECTRIC: CPT | Performed by: NURSE PRACTITIONER

## 2017-06-07 PROCEDURE — 82803 BLOOD GASES ANY COMBINATION: CPT

## 2017-06-07 RX ORDER — CEPHALEXIN 500 MG/1
500 CAPSULE ORAL 2 TIMES DAILY
Qty: 28 CAP | Refills: 0 | Status: SHIPPED | OUTPATIENT
Start: 2017-06-07 | End: 2017-06-26

## 2017-06-07 RX ORDER — PREDNISONE 50 MG/1
50 TABLET ORAL AS NEEDED
Qty: 3 TAB | Refills: 0 | Status: ON HOLD | OUTPATIENT
Start: 2017-06-07 | End: 2017-06-21 | Stop reason: ALTCHOICE

## 2017-06-07 RX ORDER — ENOXAPARIN SODIUM 100 MG/ML
80 INJECTION SUBCUTANEOUS EVERY 12 HOURS
Qty: 8 ML | Refills: 0 | Status: ON HOLD | OUTPATIENT
Start: 2017-06-16 | End: 2017-06-21 | Stop reason: ALTCHOICE

## 2017-06-07 NOTE — H&P
CSS   History and Physical    Subjective: Gladys Kelley is a 80 y.o.  female who was referred for cardiac evaluation, with PMHx of AS, rheumatic fever, MI (2012), HTN, HLD, PVD, Afib (warfarin), CAD s/p CABG (1994 by Dr. Rosenda Hull) s/p multiple PCI multiple (MARIE to SVG-L Cx 8/2011, MARIE to SVG-Diag graft 10/2014), Carotid stensios s/p R CEA (1996), PAD with intermittent claudication, CVA s/p cerebellar infarcts, Hypothyroid, Nephrolithiasis, recurrent UTIs, past tobacco abuse (smoked for 2 yrs and quit 40 yrs ago) that is referred by Dr. Noé Dumont.       Ms. Marquez has been followed by Dr. Noé Dumont for several years and most recently has been hospitalized at [facility] (4/2017) for CHF, recurrent exertional CP with minimal activity, worsening SOB/HARRISON and dizziness. She underwent TTE, cardiac cath and was discharged with a BB & an increased diuretic dosing from prn to daily. Previously, Ms. Nico Haile has been hospitalized for CHF in Jan 2015 and in Feb 2016.      Today Ms. Marquez reports persistent SOB/HARRISON with modest activity, but baseline for her. She still has occasional exertional CP and arm pain. She also has some general extremity weakness and mild LE edema that she says is improved on her daily diuretic regimen. She denies orthopnea, PND, syncope or falls.     Ms. Marquez lives with her daughter and is independent in the ADLs but her daughter assists with medication administration d/t her short term memory issues. Cardiac Testing    Cardiac catheterization 4/17/17:  3 vessel CAD; Patent LIMA -> LAD, SVG -> OMB with 80% stenosis, SVG-> RCA with . AS with CAROLYNN 0.87cm2, mildly elevated PCWP, mild pulmonary HTN, RA hypoxia, abdominal aortic aneurysm, mod-severe peripheral dz involving common iliac arteries. Heavy calcification in distal abdominal aorta, iliac arteries and femoral arteries. The infrarenal abdominal aorta has an AAA with lumen diameter approx 4.5 cm.  R iliac at least 50% stenosis in the external iliac artery. L common iliac w/ 50% stenosis   Ao Pressure: 125/33 mmHg  LV pressure: 162/13 mmHg  AV gradient 33 mmHg  CO 2.85L/m by thermodilution and 4.7 L/m by Misbah  RAP: 8 mmHg  RV: 38/18 mmHg  PA: 41/12/23  PCWP: 14  SVC oxygenation saturation 56%  PA sat 51%  Aortic oxygen saturation 86%    ECHO 2/8/17:  Severe AS with CAROLYNN 0.86cm2, meanPG 36 mmHg, peakPG 71 mmHg,mild AI, central jet of mod MR, 60% LVEF with akinesis of the basal inferolateral wall, mod pulm HTN, mod TR with estimated PASP 63 mmHg, severely dilated LA    Past Medical History:   Diagnosis Date    CAD (coronary artery disease)     mi    Heart failure ([facility] [address]     Hypertension     Thyroid disease     hypo     Past Surgical History:   Procedure Laterality Date    ABDOMEN SURGERY PROC UNLISTED      kidney stone    CARDIAC SURG PROCEDURE UNLIST      cabg    VASCULAR SURGERY PROCEDURE UNLIST      carotiderecctomy      Social History   Substance Use Topics    Smoking status: Never Smoker    Smokeless tobacco: Never Used    Alcohol use No      No family history on file. Prior to Admission medications    Medication Sig Start Date End Date Taking? Authorizing Provider   cephALEXin (KEFLEX) 500 mg capsule Take 500 mg by mouth two (2) times a day. Indications: BACTERIAL URINARY TRACT INFECTION    Historical Provider   Biotin 2,500 mcg cap Take 1 Cap by mouth nightly. Historical Provider   cholecalciferol (VITAMIN D3) 1,000 unit cap Take 1,000 Units by mouth nightly. Historical Provider   cyanocobalamin (VITAMIN B12) 500 mcg tablet Take 500 mcg by mouth nightly. Historical Provider   dilTIAZem ER (CARDIZEM LA) 180 mg Tb24 tablet Take 180 mg by mouth nightly. Historical Provider   isosorbide mononitrate ER (IMDUR) 30 mg tablet Take 30 mg by mouth nightly. Historical Provider   potassium chloride SR (KLOR-CON 10) 10 mEq tablet Take 20 mEq by mouth daily.     Historical Provider   acetaminophen (TYLENOL) 325 mg tablet Take 650 mg by mouth every four (4) hours as needed for Pain. Historical Provider   furosemide (LASIX) 20 mg tablet Take  by mouth daily. Historical Provider   levothyroxine (SYNTHROID) 125 mcg tablet Take  by mouth Daily (before breakfast). Historical Provider   rosuvastatin (CRESTOR) 10 mg tablet Take 40 mg by mouth nightly. Historical Provider   warfarin (COUMADIN) 7.5 mg tablet Take 7.5 mg by mouth daily. As directed    Historical Provider   sertraline (ZOLOFT) 50 mg tablet TAKE ONE TABLET BY MOUTH EVERY DAY 12/6/12   Sol Guadarrama MD   nitroglycerin (NITROSTAT) 0.4 mg SL tablet 1 Tab by SubLINGual route as needed for Chest Pain. 4/10/12   Humberto Shankar MD   aspirin delayed-release 81 mg tablet Take 81 mg by mouth daily. Historical Provider   atenolol (TENORMIN) 25 mg tablet Take 25 mg by mouth daily. Historical Provider       Allergies   Allergen Reactions    Levofloxacin Swelling    Codeine Unable to Obtain     makes patient cry    Contrast Agent [Iodine] Unknown (comments)     Dr. Tania Blackwood told patient that she is allergic     Review of Systems:   Consititutional: Denies fever or chills. Eyes:  Denies vision problems (cataracts). +glasses  ENT:  Denies hearing or swallowing difficulty. CV: Denies CP, claudication  Resp: Denies productive cough. : Denies dialysis or kidney problems. +burning with urination intermittently   GI: Denies ulcers, esophageal strictures, liver problems. M/S: Denies joint or bone problems, or implanted artificial hardware. Skin: Denies varicose veins  Neuro: Denies TIAs. Psych: Denies anxiety or depression. Endocrine: Denies thyroid problems or diabetes. Heme/Lymphatic: Denies easy bruising or lymphedema.      Objective:     VS:   BP: 114/70, HR: 84, RR: 16, SpO2: 95%, Temp: 98.2 F, Height: 5'6\", Weight: 168 lb    Physical Exam:    General appearance: alert, cooperative, no distress, appears stated age  Head: Normocephalic, without obvious abnormality, atraumatic  Eyes: conjunctivae/corneas clear. PERRL, EOM's intact. Fundi benign  Neck: supple, symmetrical, trachea midline, no adenopathy, thyroid: not enlarged, symmetric, no tenderness/mass/nodules, no carotid bruit and no JVD  Lungs: clear to auscultation bilaterally  Heart: Irregular rate and rhythm, S1, S2 normal, 3/6 murmur, no click, rub or gallop  Abdomen: soft, non-tender. Bowel sounds normal. No masses,  no organomegaly  Extremities: extremities normal, atraumatic, no cyanosis. +2 edema bilat  Skin: Skin color, texture, turgor normal. No rashes or lesions  Neurologic: Grossly normal    Labs:   Recent Labs      06/07/17   1106   WBC  5.1   HGB  12.0   HCT  36.9   PLT  122*   NA  143   K  3.3*   BUN  24*   CREA  1.04*   GLU  74   INR  1.3*       Diagnostics:   PA and lateral: Status post median sternotomy with coronary stents and cardiomegaly  which is new since the prior examination 12 years ago. Old granulomatous  disease. Carotid doppler: Consistent with less than 50% stenosis of the right  internal carotid and 50-69% stenosis of the left internal carotid. Vertebrals are patent with antegrade flow. PFTS-FEV1: 2.21, 120% predicted    EKG: Atrial fibrillation with premature ventricular or aberrantly conducted   complexes   Nonspecific intraventricular conduction delay   ST & T wave abnormality, consider inferior ischemia or digitalis effect   ST & T wave abnormality, consider anterolateral ischemia or digitalis effect   Prolonged QT   Assessment:     Active Problems:    Nonrheumatic aortic valve stenosis (4/8/2012)        Plan:   1. AS: plans for TAVR with Dr. Santi Auguste and Dr. Yakelin Horn. The risk and benefit of surgery were reviewed with patient and family and all questions answered and the patient wishes to proceed. Risk include infection, bleeding, stroke, heart attack, irregular heart rhythm, kidney failure and death.  The patient was instructed to stop her coumadin as of 6/15/17. Surgery is scheduled for 6/21/17. 2. A-fib: on coumadin, will stop prior to surgery and bridge with lovenox since pt has had a previous stroke    3. Hx of CVA: bridge with lovenox while off coumadin    4. Frequent UTI's: recently completed keflex, UA today negative but pt reports burning with urination. Will keep on keflex 500 mg bid until surgery    5. CAD, s/p CABG: on ASA, statin, BB. Cont current therapy    STS Risk Calculator V2.81 - Discussed by surgeon with patient.    Risk of Mortality: 5.774%   Morbidity or Mortality: 26.926%   Long Length of Stay: 14.748%   Short Length of Stay: 16.326%   Permanent Stroke: 4.932%   Prolonged Ventilation: 19.292%   DSW Infection: 0.172%   Renal Failure: 7.017%   Reoperation: 9.747%     Signed By: Jayla Camarena NP     June 7, 2017

## 2017-06-09 NOTE — PROGRESS NOTES
Pt with contrast/iodine allergy. Given prescriptions and instructions for prednisone and benadryl dosing. She will take last dose of prednisone and benadryl 1 hour prior to surgery. IV orders are signed and held to be given in preop holding (substitute methylprednisone IV for oral prednisone), or patient may take pill with small sip of water morning of if anesthesia ok with this.

## 2017-06-20 ENCOUNTER — TELEPHONE (OUTPATIENT)
Dept: CASE MANAGEMENT | Age: 82
End: 2017-06-20

## 2017-06-20 RX ORDER — ONDANSETRON 2 MG/ML
4 INJECTION INTRAMUSCULAR; INTRAVENOUS AS NEEDED
Status: CANCELLED | OUTPATIENT
Start: 2017-06-20

## 2017-06-20 RX ORDER — HYDROMORPHONE HYDROCHLORIDE 1 MG/ML
0.2 INJECTION, SOLUTION INTRAMUSCULAR; INTRAVENOUS; SUBCUTANEOUS
Status: CANCELLED | OUTPATIENT
Start: 2017-06-20

## 2017-06-20 RX ORDER — MORPHINE SULFATE 10 MG/ML
2 INJECTION, SOLUTION INTRAMUSCULAR; INTRAVENOUS
Status: CANCELLED | OUTPATIENT
Start: 2017-06-20

## 2017-06-20 RX ORDER — FENTANYL CITRATE 50 UG/ML
25 INJECTION, SOLUTION INTRAMUSCULAR; INTRAVENOUS
Status: CANCELLED | OUTPATIENT
Start: 2017-06-20

## 2017-06-20 RX ORDER — SODIUM CHLORIDE 0.9 % (FLUSH) 0.9 %
5-10 SYRINGE (ML) INJECTION AS NEEDED
Status: CANCELLED | OUTPATIENT
Start: 2017-06-20

## 2017-06-20 RX ORDER — SODIUM CHLORIDE, SODIUM LACTATE, POTASSIUM CHLORIDE, CALCIUM CHLORIDE 600; 310; 30; 20 MG/100ML; MG/100ML; MG/100ML; MG/100ML
100 INJECTION, SOLUTION INTRAVENOUS CONTINUOUS
Status: CANCELLED | OUTPATIENT
Start: 2017-06-20

## 2017-06-20 RX ORDER — DIPHENHYDRAMINE HYDROCHLORIDE 50 MG/ML
12.5 INJECTION, SOLUTION INTRAMUSCULAR; INTRAVENOUS AS NEEDED
Status: CANCELLED | OUTPATIENT
Start: 2017-06-20 | End: 2017-06-20

## 2017-06-20 RX ORDER — MIDAZOLAM HYDROCHLORIDE 1 MG/ML
0.5 INJECTION, SOLUTION INTRAMUSCULAR; INTRAVENOUS
Status: CANCELLED | OUTPATIENT
Start: 2017-06-20

## 2017-06-20 NOTE — TELEPHONE ENCOUNTER
Cardiac Surgery Care Coordinator-  [address] to review plan of care and day of surgery expectations. Encouraged Stephaniefort to verbalize and offered emotional support. Stephaniefort is without questions or concerns at this time.   Jasmyne Osullivan RN

## 2017-06-20 NOTE — PERIOP NOTES
PAT PHONE CALL ATTEMPTED, A WOMAN ANSWERED THE PHONE, PT WAS ASKED FOR BY NAME. THE WOMAN THAT ANSWERED ASKED IF SHE COULD ASK WHO WAS CALLING, SHE WAS TOLD THAT B/C OF PRIVACY SHE COULD NOT BE TOLD; SHE STATED \"THEN I REALLY CANNOT LET YOU TALK TO HER. \"  SHE WAS THANKED, AND THE CALL WAS ENDED.

## 2017-06-21 ENCOUNTER — ANESTHESIA (OUTPATIENT)
Dept: CARDIOTHORACIC SURGERY | Age: 82
DRG: 267 | End: 2017-06-21
Payer: MEDICARE

## 2017-06-21 ENCOUNTER — ANESTHESIA EVENT (OUTPATIENT)
Dept: CARDIOTHORACIC SURGERY | Age: 82
DRG: 267 | End: 2017-06-21
Payer: MEDICARE

## 2017-06-21 ENCOUNTER — APPOINTMENT (OUTPATIENT)
Dept: GENERAL RADIOLOGY | Age: 82
DRG: 267 | End: 2017-06-21
Attending: NURSE PRACTITIONER
Payer: MEDICARE

## 2017-06-21 ENCOUNTER — HOSPITAL ENCOUNTER (INPATIENT)
Age: 82
LOS: 5 days | Discharge: HOME HEALTH | DRG: 267 | End: 2017-06-26
Attending: THORACIC SURGERY (CARDIOTHORACIC VASCULAR SURGERY) | Admitting: THORACIC SURGERY (CARDIOTHORACIC VASCULAR SURGERY)
Payer: MEDICARE

## 2017-06-21 PROBLEM — I35.0 AORTIC STENOSIS: Status: ACTIVE | Noted: 2017-06-21

## 2017-06-21 LAB
ADMINISTERED INITIALS, ADMINIT: NORMAL
ALBUMIN SERPL BCP-MCNC: 3 G/DL (ref 3.5–5)
ALBUMIN/GLOB SERPL: 1.1 {RATIO} (ref 1.1–2.2)
ALP SERPL-CCNC: 62 U/L (ref 45–117)
ALT SERPL-CCNC: 34 U/L (ref 12–78)
ANION GAP BLD CALC-SCNC: 8 MMOL/L (ref 5–15)
APTT PPP: 27.6 SEC (ref 22.1–32.5)
ARTERIAL PATENCY WRIST A: ABNORMAL
ARTERIAL PATENCY WRIST A: NO
AST SERPL W P-5'-P-CCNC: 28 U/L (ref 15–37)
ATRIAL RATE: 88 BPM
BASE DEFICIT BLD-SCNC: 2 MMOL/L
BASE DEFICIT BLD-SCNC: 3 MMOL/L
BASOPHILS # BLD AUTO: 0 K/UL (ref 0–0.1)
BASOPHILS # BLD: 0 % (ref 0–1)
BDY SITE: ABNORMAL
BDY SITE: NORMAL
BILIRUB SERPL-MCNC: 0.5 MG/DL (ref 0.2–1)
BUN SERPL-MCNC: 24 MG/DL (ref 6–20)
BUN/CREAT SERPL: 29 (ref 12–20)
CALCIUM SERPL-MCNC: 8.4 MG/DL (ref 8.5–10.1)
CALCULATED R AXIS, ECG10: -49 DEGREES
CALCULATED T AXIS, ECG11: 141 DEGREES
CHLORIDE SERPL-SCNC: 111 MMOL/L (ref 97–108)
CO2 SERPL-SCNC: 25 MMOL/L (ref 21–32)
CREAT SERPL-MCNC: 0.82 MG/DL (ref 0.55–1.02)
D50 ADMINISTERED, D50ADM: 0 ML
D50 ORDER, D50ORD: 0 ML
DIAGNOSIS, 93000: NORMAL
DIFFERENTIAL METHOD BLD: ABNORMAL
EOSINOPHIL # BLD: 0 K/UL (ref 0–0.4)
EOSINOPHIL NFR BLD: 0 % (ref 0–7)
ERYTHROCYTE [DISTWIDTH] IN BLOOD BY AUTOMATED COUNT: 17.9 % (ref 11.5–14.5)
GAS FLOW.O2 O2 DELIVERY SYS: ABNORMAL L/MIN
GAS FLOW.O2 O2 DELIVERY SYS: NORMAL L/MIN
GAS FLOW.O2 SETTING OXYMISER: 12 BPM
GLOBULIN SER CALC-MCNC: 2.8 G/DL (ref 2–4)
GLSCOM COMMENTS: NORMAL
GLUCOSE BLD STRIP.AUTO-MCNC: 147 MG/DL (ref 65–100)
GLUCOSE SERPL-MCNC: 144 MG/DL (ref 65–100)
GLUCOSE, GLC: 136 MG/DL
GLUCOSE, GLC: 147 MG/DL
GLUCOSE, GLC: 150 MG/DL
GLUCOSE, GLC: 154 MG/DL
HCO3 BLD-SCNC: 22.1 MMOL/L (ref 22–26)
HCO3 BLD-SCNC: 23.3 MMOL/L (ref 22–26)
HCT VFR BLD AUTO: 30.4 % (ref 35–47)
HGB BLD-MCNC: 9.8 G/DL (ref 11.5–16)
HIGH TARGET, HITG: 130 MG/DL
HIGH TARGET, HITG: 140 MG/DL
INR PPP: 1.2 (ref 0.9–1.1)
INSULIN ADMINSTERED, INSADM: 1.9 UNITS/HOUR
INSULIN ADMINSTERED, INSADM: 2.3 UNITS/HOUR
INSULIN ADMINSTERED, INSADM: 2.7 UNITS/HOUR
INSULIN ADMINSTERED, INSADM: 3.5 UNITS/HOUR
INSULIN ORDER, INSORD: 1.9 UNITS/HOUR
INSULIN ORDER, INSORD: 2.3 UNITS/HOUR
INSULIN ORDER, INSORD: 2.7 UNITS/HOUR
INSULIN ORDER, INSORD: 3.5 UNITS/HOUR
LOW TARGET, LOT: 100 MG/DL
LOW TARGET, LOT: 95 MG/DL
LYMPHOCYTES # BLD AUTO: 5 % (ref 12–49)
LYMPHOCYTES # BLD: 0.3 K/UL (ref 0.8–3.5)
MAGNESIUM SERPL-MCNC: 2.1 MG/DL (ref 1.6–2.4)
MCH RBC QN AUTO: 26 PG (ref 26–34)
MCHC RBC AUTO-ENTMCNC: 32.2 G/DL (ref 30–36.5)
MCV RBC AUTO: 80.6 FL (ref 80–99)
MINUTES UNTIL NEXT BG, NBG: 60 MIN
MONOCYTES # BLD: 0.3 K/UL (ref 0–1)
MONOCYTES NFR BLD AUTO: 4 % (ref 5–13)
MULTIPLIER, MUL: 0.02
MULTIPLIER, MUL: 0.03
MULTIPLIER, MUL: 0.03
MULTIPLIER, MUL: 0.04
NEUTS SEG # BLD: 6.1 K/UL (ref 1.8–8)
NEUTS SEG NFR BLD AUTO: 91 % (ref 32–75)
O2/TOTAL GAS SETTING VFR VENT: 50 %
O2/TOTAL GAS SETTING VFR VENT: 80 %
ORDER INITIALS, ORDINIT: NORMAL
PCO2 BLD: 35.2 MMHG (ref 35–45)
PCO2 BLD: 39.7 MMHG (ref 35–45)
PEEP RESPIRATORY: 5 CMH2O
PEEP RESPIRATORY: 5 CMH2O
PH BLD: 7.38 [PH] (ref 7.35–7.45)
PH BLD: 7.41 [PH] (ref 7.35–7.45)
PLATELET # BLD AUTO: 99 K/UL (ref 150–400)
PO2 BLD: 120 MMHG (ref 80–100)
PO2 BLD: 90 MMHG (ref 80–100)
POTASSIUM SERPL-SCNC: 3.3 MMOL/L (ref 3.5–5.1)
PRESSURE SUPPORT SETTING VENT: 5 CMH2O
PRESSURE SUPPORT SETTING VENT: 5 CMH2O
PROT SERPL-MCNC: 5.8 G/DL (ref 6.4–8.2)
PROTHROMBIN TIME: 12.3 SEC (ref 9–11.1)
Q-T INTERVAL, ECG07: 426 MS
QRS DURATION, ECG06: 120 MS
QTC CALCULATION (BEZET), ECG08: 523 MS
RBC # BLD AUTO: 3.77 M/UL (ref 3.8–5.2)
RBC MORPH BLD: ABNORMAL
SAO2 % BLD: 97 % (ref 92–97)
SAO2 % BLD: 99 % (ref 92–97)
SERVICE CMNT-IMP: ABNORMAL
SODIUM SERPL-SCNC: 144 MMOL/L (ref 136–145)
SPECIMEN TYPE: ABNORMAL
SPECIMEN TYPE: NORMAL
THERAPEUTIC RANGE,PTTT: NORMAL SECS (ref 58–77)
TOTAL RESP. RATE, ITRR: 16
TOTAL RESP. RATE, ITRR: 22
VENTILATION MODE VENT: ABNORMAL
VENTILATION MODE VENT: NORMAL
VENTRICULAR RATE, ECG03: 91 BPM
VT SETTING VENT: 500 ML
WBC # BLD AUTO: 6.7 K/UL (ref 3.6–11)

## 2017-06-21 PROCEDURE — 85610 PROTHROMBIN TIME: CPT | Performed by: NURSE PRACTITIONER

## 2017-06-21 PROCEDURE — 77030037468 HC VLV AORT EDWRD -L: Performed by: THORACIC SURGERY (CARDIOTHORACIC VASCULAR SURGERY)

## 2017-06-21 PROCEDURE — 77030020269 HC MISC IMPL: Performed by: THORACIC SURGERY (CARDIOTHORACIC VASCULAR SURGERY)

## 2017-06-21 PROCEDURE — 74011250636 HC RX REV CODE- 250/636: Performed by: THORACIC SURGERY (CARDIOTHORACIC VASCULAR SURGERY)

## 2017-06-21 PROCEDURE — C1769 GUIDE WIRE: HCPCS

## 2017-06-21 PROCEDURE — 77030004532 HC CATH ANGI DX IMP BSC -A: Performed by: THORACIC SURGERY (CARDIOTHORACIC VASCULAR SURGERY)

## 2017-06-21 PROCEDURE — 74011000250 HC RX REV CODE- 250: Performed by: THORACIC SURGERY (CARDIOTHORACIC VASCULAR SURGERY)

## 2017-06-21 PROCEDURE — 77030005322: Performed by: THORACIC SURGERY (CARDIOTHORACIC VASCULAR SURGERY)

## 2017-06-21 PROCEDURE — 77030008771 HC TU NG SALEM SUMP -A: Performed by: ANESTHESIOLOGY

## 2017-06-21 PROCEDURE — 74011000258 HC RX REV CODE- 258: Performed by: NURSE PRACTITIONER

## 2017-06-21 PROCEDURE — 74011250636 HC RX REV CODE- 250/636: Performed by: NURSE PRACTITIONER

## 2017-06-21 PROCEDURE — C1892 INTRO/SHEATH,FIXED,PEEL-AWAY: HCPCS | Performed by: THORACIC SURGERY (CARDIOTHORACIC VASCULAR SURGERY)

## 2017-06-21 PROCEDURE — 76010000202 HC CV SURG 3 TO 3.5 HR INTENSV-TIER 1: Performed by: THORACIC SURGERY (CARDIOTHORACIC VASCULAR SURGERY)

## 2017-06-21 PROCEDURE — 77030008684 HC TU ET CUF COVD -B: Performed by: ANESTHESIOLOGY

## 2017-06-21 PROCEDURE — 74011250636 HC RX REV CODE- 250/636: Performed by: ANESTHESIOLOGY

## 2017-06-21 PROCEDURE — 94002 VENT MGMT INPAT INIT DAY: CPT

## 2017-06-21 PROCEDURE — 77030034850: Performed by: THORACIC SURGERY (CARDIOTHORACIC VASCULAR SURGERY)

## 2017-06-21 PROCEDURE — C1751 CATH, INF, PER/CENT/MIDLINE: HCPCS

## 2017-06-21 PROCEDURE — 77030020061 HC IV BLD WRMR ADMIN SET 3M -B: Performed by: ANESTHESIOLOGY

## 2017-06-21 PROCEDURE — 82962 GLUCOSE BLOOD TEST: CPT

## 2017-06-21 PROCEDURE — 74011250636 HC RX REV CODE- 250/636

## 2017-06-21 PROCEDURE — 77030013744

## 2017-06-21 PROCEDURE — 77030013798 HC KT TRNSDUC PRSSR EDWD -B: Performed by: THORACIC SURGERY (CARDIOTHORACIC VASCULAR SURGERY)

## 2017-06-21 PROCEDURE — 65610000003 HC RM ICU SURGICAL

## 2017-06-21 PROCEDURE — 77030010938 HC CLP LIG TELE -A: Performed by: THORACIC SURGERY (CARDIOTHORACIC VASCULAR SURGERY)

## 2017-06-21 PROCEDURE — 02RF38Z REPLACEMENT OF AORTIC VALVE WITH ZOOPLASTIC TISSUE, PERCUTANEOUS APPROACH: ICD-10-PCS | Performed by: THORACIC SURGERY (CARDIOTHORACIC VASCULAR SURGERY)

## 2017-06-21 PROCEDURE — 74011250637 HC RX REV CODE- 250/637: Performed by: NURSE PRACTITIONER

## 2017-06-21 PROCEDURE — 80053 COMPREHEN METABOLIC PANEL: CPT | Performed by: NURSE PRACTITIONER

## 2017-06-21 PROCEDURE — 77030010507 HC ADH SKN DERMBND J&J -B: Performed by: THORACIC SURGERY (CARDIOTHORACIC VASCULAR SURGERY)

## 2017-06-21 PROCEDURE — 74011000250 HC RX REV CODE- 250

## 2017-06-21 PROCEDURE — 85025 COMPLETE CBC W/AUTO DIFF WBC: CPT | Performed by: NURSE PRACTITIONER

## 2017-06-21 PROCEDURE — 77030010516 HC APPL HEMA CLP TELE -B: Performed by: THORACIC SURGERY (CARDIOTHORACIC VASCULAR SURGERY)

## 2017-06-21 PROCEDURE — 74011000258 HC RX REV CODE- 258: Performed by: THORACIC SURGERY (CARDIOTHORACIC VASCULAR SURGERY)

## 2017-06-21 PROCEDURE — 74011636637 HC RX REV CODE- 636/637

## 2017-06-21 PROCEDURE — 77030013797 HC KT TRNSDUC PRSSR EDWD -A: Performed by: THORACIC SURGERY (CARDIOTHORACIC VASCULAR SURGERY)

## 2017-06-21 PROCEDURE — 77030018719 HC DRSG PTCH ANTIMIC J&J -A

## 2017-06-21 PROCEDURE — 77030005402 HC CATH RAD ART LN KT TELE -B

## 2017-06-21 PROCEDURE — C1894 INTRO/SHEATH, NON-LASER: HCPCS | Performed by: THORACIC SURGERY (CARDIOTHORACIC VASCULAR SURGERY)

## 2017-06-21 PROCEDURE — C1768 GRAFT, VASCULAR: HCPCS | Performed by: THORACIC SURGERY (CARDIOTHORACIC VASCULAR SURGERY)

## 2017-06-21 PROCEDURE — 77030002973 HC SUT PLEDG CV SFT OVL TELE -B: Performed by: THORACIC SURGERY (CARDIOTHORACIC VASCULAR SURGERY)

## 2017-06-21 PROCEDURE — 36415 COLL VENOUS BLD VENIPUNCTURE: CPT | Performed by: NURSE PRACTITIONER

## 2017-06-21 PROCEDURE — 85730 THROMBOPLASTIN TIME PARTIAL: CPT | Performed by: NURSE PRACTITIONER

## 2017-06-21 PROCEDURE — C1769 GUIDE WIRE: HCPCS | Performed by: THORACIC SURGERY (CARDIOTHORACIC VASCULAR SURGERY)

## 2017-06-21 PROCEDURE — 77030019702 HC WRP THER MENM -C: Performed by: THORACIC SURGERY (CARDIOTHORACIC VASCULAR SURGERY)

## 2017-06-21 PROCEDURE — 77030003020 HC SUT TICRN COVD -A: Performed by: THORACIC SURGERY (CARDIOTHORACIC VASCULAR SURGERY)

## 2017-06-21 PROCEDURE — 76060000037 HC ANESTHESIA 3 TO 3.5 HR: Performed by: THORACIC SURGERY (CARDIOTHORACIC VASCULAR SURGERY)

## 2017-06-21 PROCEDURE — 74011000250 HC RX REV CODE- 250: Performed by: NURSE PRACTITIONER

## 2017-06-21 PROCEDURE — 77030018729 HC ELECTRD DEFIB PAD CARD -B

## 2017-06-21 PROCEDURE — 77030019908 HC STETH ESOPH SIMS -A: Performed by: ANESTHESIOLOGY

## 2017-06-21 PROCEDURE — 77030002996 HC SUT SLK J&J -A: Performed by: THORACIC SURGERY (CARDIOTHORACIC VASCULAR SURGERY)

## 2017-06-21 PROCEDURE — 77030004533 HC CATH ANGI DX IMP BSC -B: Performed by: THORACIC SURGERY (CARDIOTHORACIC VASCULAR SURGERY)

## 2017-06-21 PROCEDURE — 77030012390 HC DRN CHST BTL GTNG -B: Performed by: THORACIC SURGERY (CARDIOTHORACIC VASCULAR SURGERY)

## 2017-06-21 PROCEDURE — 71010 XR CHEST PORT: CPT

## 2017-06-21 PROCEDURE — 77030018836 HC SOL IRR NACL ICUM -A: Performed by: THORACIC SURGERY (CARDIOTHORACIC VASCULAR SURGERY)

## 2017-06-21 PROCEDURE — 83735 ASSAY OF MAGNESIUM: CPT | Performed by: NURSE PRACTITIONER

## 2017-06-21 PROCEDURE — 93005 ELECTROCARDIOGRAM TRACING: CPT

## 2017-06-21 PROCEDURE — B246ZZ4 ULTRASONOGRAPHY OF RIGHT AND LEFT HEART, TRANSESOPHAGEAL: ICD-10-PCS | Performed by: ANESTHESIOLOGY

## 2017-06-21 PROCEDURE — 82803 BLOOD GASES ANY COMBINATION: CPT

## 2017-06-21 PROCEDURE — 74011000258 HC RX REV CODE- 258

## 2017-06-21 PROCEDURE — 77030013469: Performed by: THORACIC SURGERY (CARDIOTHORACIC VASCULAR SURGERY)

## 2017-06-21 PROCEDURE — 77030026438 HC STYL ET INTUB CARD -A: Performed by: ANESTHESIOLOGY

## 2017-06-21 PROCEDURE — 77030011640 HC PAD GRND REM COVD -A: Performed by: THORACIC SURGERY (CARDIOTHORACIC VASCULAR SURGERY)

## 2017-06-21 PROCEDURE — 77030012407 HC DRN WND BARD -B: Performed by: THORACIC SURGERY (CARDIOTHORACIC VASCULAR SURGERY)

## 2017-06-21 DEVICE — VALVE AORTIC SAPEIN 3 23MM -- COMMANDER SYS 9600TFX: Type: IMPLANTABLE DEVICE | Site: HEART | Status: FUNCTIONAL

## 2017-06-21 DEVICE — FELT SURG W6XL6IN THK1.65MM PTFE FOR THE REP OF SEPT DEFCT: Type: IMPLANTABLE DEVICE | Site: HEART | Status: FUNCTIONAL

## 2017-06-21 RX ORDER — LIDOCAINE HYDROCHLORIDE 20 MG/ML
INJECTION, SOLUTION EPIDURAL; INFILTRATION; INTRACAUDAL; PERINEURAL AS NEEDED
Status: DISCONTINUED | OUTPATIENT
Start: 2017-06-21 | End: 2017-06-21 | Stop reason: HOSPADM

## 2017-06-21 RX ORDER — POTASSIUM CHLORIDE 29.8 MG/ML
20 INJECTION INTRAVENOUS
Status: DISPENSED | OUTPATIENT
Start: 2017-06-21 | End: 2017-06-22

## 2017-06-21 RX ORDER — SODIUM CHLORIDE 9 MG/ML
25 INJECTION, SOLUTION INTRAVENOUS CONTINUOUS
Status: DISCONTINUED | OUTPATIENT
Start: 2017-06-21 | End: 2017-06-21 | Stop reason: HOSPADM

## 2017-06-21 RX ORDER — SODIUM CHLORIDE 0.9 % (FLUSH) 0.9 %
5-10 SYRINGE (ML) INJECTION AS NEEDED
Status: DISCONTINUED | OUTPATIENT
Start: 2017-06-21 | End: 2017-06-22

## 2017-06-21 RX ORDER — SERTRALINE HYDROCHLORIDE 50 MG/1
50 TABLET, FILM COATED ORAL DAILY
Status: DISCONTINUED | OUTPATIENT
Start: 2017-06-21 | End: 2017-06-26 | Stop reason: HOSPADM

## 2017-06-21 RX ORDER — SODIUM CHLORIDE, SODIUM LACTATE, POTASSIUM CHLORIDE, CALCIUM CHLORIDE 600; 310; 30; 20 MG/100ML; MG/100ML; MG/100ML; MG/100ML
INJECTION, SOLUTION INTRAVENOUS
Status: DISCONTINUED | OUTPATIENT
Start: 2017-06-21 | End: 2017-06-21 | Stop reason: HOSPADM

## 2017-06-21 RX ORDER — HEPARIN SODIUM 1000 [USP'U]/ML
INJECTION, SOLUTION INTRAVENOUS; SUBCUTANEOUS AS NEEDED
Status: DISCONTINUED | OUTPATIENT
Start: 2017-06-21 | End: 2017-06-21 | Stop reason: HOSPADM

## 2017-06-21 RX ORDER — PROPOFOL 10 MG/ML
INJECTION, EMULSION INTRAVENOUS AS NEEDED
Status: DISCONTINUED | OUTPATIENT
Start: 2017-06-21 | End: 2017-06-21 | Stop reason: HOSPADM

## 2017-06-21 RX ORDER — SODIUM CHLORIDE 0.9 % (FLUSH) 0.9 %
20 SYRINGE (ML) INJECTION EVERY 24 HOURS
Status: DISCONTINUED | OUTPATIENT
Start: 2017-06-21 | End: 2017-06-22

## 2017-06-21 RX ORDER — FENTANYL CITRATE 50 UG/ML
50 INJECTION, SOLUTION INTRAMUSCULAR; INTRAVENOUS AS NEEDED
Status: DISCONTINUED | OUTPATIENT
Start: 2017-06-21 | End: 2017-06-21 | Stop reason: HOSPADM

## 2017-06-21 RX ORDER — SODIUM CHLORIDE 0.9 % (FLUSH) 0.9 %
10 SYRINGE (ML) INJECTION AS NEEDED
Status: DISCONTINUED | OUTPATIENT
Start: 2017-06-21 | End: 2017-06-22

## 2017-06-21 RX ORDER — SODIUM CHLORIDE 9 MG/ML
INJECTION, SOLUTION INTRAVENOUS
Status: DISCONTINUED | OUTPATIENT
Start: 2017-06-21 | End: 2017-06-21

## 2017-06-21 RX ORDER — SODIUM CHLORIDE, SODIUM LACTATE, POTASSIUM CHLORIDE, CALCIUM CHLORIDE 600; 310; 30; 20 MG/100ML; MG/100ML; MG/100ML; MG/100ML
25 INJECTION, SOLUTION INTRAVENOUS CONTINUOUS
Status: DISCONTINUED | OUTPATIENT
Start: 2017-06-21 | End: 2017-06-21 | Stop reason: HOSPADM

## 2017-06-21 RX ORDER — MAGNESIUM SULFATE 1 G/100ML
1 INJECTION INTRAVENOUS AS NEEDED
Status: DISCONTINUED | OUTPATIENT
Start: 2017-06-21 | End: 2017-06-22

## 2017-06-21 RX ORDER — ROCURONIUM BROMIDE 10 MG/ML
INJECTION, SOLUTION INTRAVENOUS AS NEEDED
Status: DISCONTINUED | OUTPATIENT
Start: 2017-06-21 | End: 2017-06-21 | Stop reason: HOSPADM

## 2017-06-21 RX ORDER — MIDAZOLAM HYDROCHLORIDE 1 MG/ML
1 INJECTION, SOLUTION INTRAMUSCULAR; INTRAVENOUS AS NEEDED
Status: DISCONTINUED | OUTPATIENT
Start: 2017-06-21 | End: 2017-06-21 | Stop reason: HOSPADM

## 2017-06-21 RX ORDER — SODIUM CHLORIDE 0.9 % (FLUSH) 0.9 %
5-10 SYRINGE (ML) INJECTION AS NEEDED
Status: DISCONTINUED | OUTPATIENT
Start: 2017-06-21 | End: 2017-06-21 | Stop reason: HOSPADM

## 2017-06-21 RX ORDER — SODIUM CHLORIDE 0.9 % (FLUSH) 0.9 %
5-10 SYRINGE (ML) INJECTION EVERY 8 HOURS
Status: DISCONTINUED | OUTPATIENT
Start: 2017-06-21 | End: 2017-06-22

## 2017-06-21 RX ORDER — GUAIFENESIN 100 MG/5ML
81 LIQUID (ML) ORAL DAILY
Status: DISCONTINUED | OUTPATIENT
Start: 2017-06-22 | End: 2017-06-26 | Stop reason: HOSPADM

## 2017-06-21 RX ORDER — LANOLIN ALCOHOL/MO/W.PET/CERES
400 CREAM (GRAM) TOPICAL EVERY 12 HOURS
Status: DISCONTINUED | OUTPATIENT
Start: 2017-06-22 | End: 2017-06-26 | Stop reason: HOSPADM

## 2017-06-21 RX ORDER — MORPHINE SULFATE 4 MG/ML
4 INJECTION, SOLUTION INTRAMUSCULAR; INTRAVENOUS
Status: DISCONTINUED | OUTPATIENT
Start: 2017-06-21 | End: 2017-06-21

## 2017-06-21 RX ORDER — MORPHINE SULFATE 4 MG/ML
INJECTION, SOLUTION INTRAMUSCULAR; INTRAVENOUS AS NEEDED
Status: DISCONTINUED | OUTPATIENT
Start: 2017-06-21 | End: 2017-06-21 | Stop reason: HOSPADM

## 2017-06-21 RX ORDER — PROTAMINE SULFATE 10 MG/ML
INJECTION, SOLUTION INTRAVENOUS AS NEEDED
Status: DISCONTINUED | OUTPATIENT
Start: 2017-06-21 | End: 2017-06-21 | Stop reason: HOSPADM

## 2017-06-21 RX ORDER — ROSUVASTATIN CALCIUM 40 MG/1
40 TABLET, COATED ORAL
Status: DISCONTINUED | OUTPATIENT
Start: 2017-06-21 | End: 2017-06-26 | Stop reason: HOSPADM

## 2017-06-21 RX ORDER — AMOXICILLIN 250 MG
1 CAPSULE ORAL 2 TIMES DAILY
Status: DISCONTINUED | OUTPATIENT
Start: 2017-06-22 | End: 2017-06-26 | Stop reason: HOSPADM

## 2017-06-21 RX ORDER — MORPHINE SULFATE 2 MG/ML
2 INJECTION, SOLUTION INTRAMUSCULAR; INTRAVENOUS
Status: DISCONTINUED | OUTPATIENT
Start: 2017-06-21 | End: 2017-06-22

## 2017-06-21 RX ORDER — ROPIVACAINE HYDROCHLORIDE 5 MG/ML
30 INJECTION, SOLUTION EPIDURAL; INFILTRATION; PERINEURAL AS NEEDED
Status: DISCONTINUED | OUTPATIENT
Start: 2017-06-21 | End: 2017-06-21 | Stop reason: HOSPADM

## 2017-06-21 RX ORDER — FAMOTIDINE 20 MG/1
20 TABLET, FILM COATED ORAL EVERY 12 HOURS
Status: DISCONTINUED | OUTPATIENT
Start: 2017-06-22 | End: 2017-06-26 | Stop reason: HOSPADM

## 2017-06-21 RX ORDER — SODIUM CHLORIDE 0.9 % (FLUSH) 0.9 %
10 SYRINGE (ML) INJECTION EVERY 8 HOURS
Status: DISCONTINUED | OUTPATIENT
Start: 2017-06-21 | End: 2017-06-22

## 2017-06-21 RX ORDER — MORPHINE SULFATE 4 MG/ML
4 INJECTION, SOLUTION INTRAMUSCULAR; INTRAVENOUS
Status: DISCONTINUED | OUTPATIENT
Start: 2017-06-21 | End: 2017-06-22

## 2017-06-21 RX ORDER — DIPHENHYDRAMINE HYDROCHLORIDE 50 MG/ML
50 INJECTION, SOLUTION INTRAMUSCULAR; INTRAVENOUS ONCE
Status: COMPLETED | OUTPATIENT
Start: 2017-06-21 | End: 2017-06-21

## 2017-06-21 RX ORDER — CEFAZOLIN SODIUM IN 0.9 % NACL 2 G/100 ML
PLASTIC BAG, INJECTION (ML) INTRAVENOUS AS NEEDED
Status: DISCONTINUED | OUTPATIENT
Start: 2017-06-21 | End: 2017-06-21 | Stop reason: HOSPADM

## 2017-06-21 RX ORDER — OXYCODONE AND ACETAMINOPHEN 5; 325 MG/1; MG/1
2 TABLET ORAL
Status: DISCONTINUED | OUTPATIENT
Start: 2017-06-21 | End: 2017-06-21

## 2017-06-21 RX ORDER — BUPIVACAINE HYDROCHLORIDE 5 MG/ML
INJECTION, SOLUTION EPIDURAL; INTRACAUDAL AS NEEDED
Status: DISCONTINUED | OUTPATIENT
Start: 2017-06-21 | End: 2017-06-21 | Stop reason: HOSPADM

## 2017-06-21 RX ORDER — SODIUM CHLORIDE 450 MG/100ML
10 INJECTION, SOLUTION INTRAVENOUS CONTINUOUS
Status: DISCONTINUED | OUTPATIENT
Start: 2017-06-21 | End: 2017-06-22

## 2017-06-21 RX ORDER — SODIUM CHLORIDE 0.9 % (FLUSH) 0.9 %
5-10 SYRINGE (ML) INJECTION EVERY 8 HOURS
Status: DISCONTINUED | OUTPATIENT
Start: 2017-06-21 | End: 2017-06-21 | Stop reason: HOSPADM

## 2017-06-21 RX ORDER — SODIUM CHLORIDE 0.9 % (FLUSH) 0.9 %
20 SYRINGE (ML) INJECTION AS NEEDED
Status: DISCONTINUED | OUTPATIENT
Start: 2017-06-21 | End: 2017-06-22

## 2017-06-21 RX ORDER — CEFAZOLIN SODIUM IN 0.9 % NACL 2 G/50 ML
2 INTRAVENOUS SOLUTION, PIGGYBACK (ML) INTRAVENOUS ONCE
Status: DISCONTINUED | OUTPATIENT
Start: 2017-06-21 | End: 2017-06-21 | Stop reason: HOSPADM

## 2017-06-21 RX ORDER — PROPOFOL 10 MG/ML
INJECTION, EMULSION INTRAVENOUS
Status: DISCONTINUED | OUTPATIENT
Start: 2017-06-21 | End: 2017-06-21 | Stop reason: HOSPADM

## 2017-06-21 RX ORDER — FACIAL-BODY WIPES
10 EACH TOPICAL DAILY PRN
Status: DISCONTINUED | OUTPATIENT
Start: 2017-06-21 | End: 2017-06-26 | Stop reason: HOSPADM

## 2017-06-21 RX ORDER — SODIUM CHLORIDE 9 MG/ML
INJECTION, SOLUTION INTRAVENOUS
Status: DISCONTINUED | OUTPATIENT
Start: 2017-06-21 | End: 2017-06-21 | Stop reason: HOSPADM

## 2017-06-21 RX ORDER — LIDOCAINE HYDROCHLORIDE 10 MG/ML
0.1 INJECTION, SOLUTION EPIDURAL; INFILTRATION; INTRACAUDAL; PERINEURAL AS NEEDED
Status: DISCONTINUED | OUTPATIENT
Start: 2017-06-21 | End: 2017-06-21 | Stop reason: HOSPADM

## 2017-06-21 RX ORDER — MORPHINE SULFATE 2 MG/ML
2 INJECTION, SOLUTION INTRAMUSCULAR; INTRAVENOUS
Status: DISCONTINUED | OUTPATIENT
Start: 2017-06-21 | End: 2017-06-21

## 2017-06-21 RX ORDER — SODIUM CHLORIDE 0.9 % (FLUSH) 0.9 %
10 SYRINGE (ML) INJECTION EVERY 24 HOURS
Status: DISCONTINUED | OUTPATIENT
Start: 2017-06-21 | End: 2017-06-22

## 2017-06-21 RX ORDER — CEFAZOLIN SODIUM IN 0.9 % NACL 2 G/50 ML
2 INTRAVENOUS SOLUTION, PIGGYBACK (ML) INTRAVENOUS EVERY 6 HOURS
Status: COMPLETED | OUTPATIENT
Start: 2017-06-21 | End: 2017-06-23

## 2017-06-21 RX ORDER — FAMOTIDINE 20 MG/1
20 TABLET, FILM COATED ORAL DAILY
Status: DISCONTINUED | OUTPATIENT
Start: 2017-06-22 | End: 2017-06-21 | Stop reason: DRUGHIGH

## 2017-06-21 RX ORDER — LEVOTHYROXINE SODIUM 125 UG/1
125 TABLET ORAL
Status: DISCONTINUED | OUTPATIENT
Start: 2017-06-22 | End: 2017-06-26 | Stop reason: HOSPADM

## 2017-06-21 RX ORDER — ACETAMINOPHEN 325 MG/1
650 TABLET ORAL
Status: DISCONTINUED | OUTPATIENT
Start: 2017-06-21 | End: 2017-06-26 | Stop reason: HOSPADM

## 2017-06-21 RX ORDER — ALBUTEROL SULFATE 0.83 MG/ML
2.5 SOLUTION RESPIRATORY (INHALATION)
Status: DISCONTINUED | OUTPATIENT
Start: 2017-06-21 | End: 2017-06-26 | Stop reason: HOSPADM

## 2017-06-21 RX ORDER — OXYCODONE HYDROCHLORIDE 5 MG/1
5-10 TABLET ORAL
Status: DISPENSED | OUTPATIENT
Start: 2017-06-21 | End: 2017-06-22

## 2017-06-21 RX ORDER — BACITRACIN 500 UNIT/G
1 PACKET (EA) TOPICAL AS NEEDED
Status: DISCONTINUED | OUTPATIENT
Start: 2017-06-21 | End: 2017-06-26 | Stop reason: HOSPADM

## 2017-06-21 RX ORDER — ONDANSETRON 2 MG/ML
4 INJECTION INTRAMUSCULAR; INTRAVENOUS
Status: DISCONTINUED | OUTPATIENT
Start: 2017-06-21 | End: 2017-06-26 | Stop reason: HOSPADM

## 2017-06-21 RX ORDER — POTASSIUM CHLORIDE 29.8 MG/ML
20 INJECTION INTRAVENOUS
Status: DISCONTINUED | OUTPATIENT
Start: 2017-06-21 | End: 2017-06-22

## 2017-06-21 RX ORDER — ACETAMINOPHEN 10 MG/ML
1000 INJECTION, SOLUTION INTRAVENOUS EVERY 6 HOURS
Status: COMPLETED | OUTPATIENT
Start: 2017-06-21 | End: 2017-06-22

## 2017-06-21 RX ORDER — CHLORHEXIDINE GLUCONATE 1.2 MG/ML
10 RINSE ORAL 2 TIMES DAILY
Status: COMPLETED | OUTPATIENT
Start: 2017-06-21 | End: 2017-06-25

## 2017-06-21 RX ORDER — SODIUM CHLORIDE 9 MG/ML
9 INJECTION, SOLUTION INTRAVENOUS CONTINUOUS
Status: DISCONTINUED | OUTPATIENT
Start: 2017-06-21 | End: 2017-06-22

## 2017-06-21 RX ADMIN — SODIUM CHLORIDE 5 MG/HR: 900 INJECTION, SOLUTION INTRAVENOUS at 10:56

## 2017-06-21 RX ADMIN — CEFAZOLIN 2 G: 1 INJECTION, POWDER, FOR SOLUTION INTRAMUSCULAR; INTRAVENOUS; PARENTERAL at 16:34

## 2017-06-21 RX ADMIN — Medication 10 ML: at 11:00

## 2017-06-21 RX ADMIN — Medication 10 ML: at 21:10

## 2017-06-21 RX ADMIN — ACETAMINOPHEN 1000 MG: 10 INJECTION, SOLUTION INTRAVENOUS at 14:06

## 2017-06-21 RX ADMIN — MORPHINE SULFATE 2 MG: 4 INJECTION, SOLUTION INTRAMUSCULAR; INTRAVENOUS at 08:02

## 2017-06-21 RX ADMIN — DIPHENHYDRAMINE HYDROCHLORIDE 50 MG: 50 INJECTION, SOLUTION INTRAMUSCULAR; INTRAVENOUS at 07:25

## 2017-06-21 RX ADMIN — Medication 2 MG: at 22:20

## 2017-06-21 RX ADMIN — Medication 10 ML: at 13:37

## 2017-06-21 RX ADMIN — SODIUM CHLORIDE, SODIUM LACTATE, POTASSIUM CHLORIDE, AND CALCIUM CHLORIDE 25 ML/HR: 600; 310; 30; 20 INJECTION, SOLUTION INTRAVENOUS at 06:40

## 2017-06-21 RX ADMIN — SODIUM CHLORIDE, SODIUM LACTATE, POTASSIUM CHLORIDE, CALCIUM CHLORIDE: 600; 310; 30; 20 INJECTION, SOLUTION INTRAVENOUS at 07:30

## 2017-06-21 RX ADMIN — CEFAZOLIN 2 G: 1 INJECTION, POWDER, FOR SOLUTION INTRAMUSCULAR; INTRAVENOUS; PARENTERAL at 12:00

## 2017-06-21 RX ADMIN — POTASSIUM CHLORIDE 20 MEQ: 400 INJECTION, SOLUTION INTRAVENOUS at 15:59

## 2017-06-21 RX ADMIN — LIDOCAINE HYDROCHLORIDE 20 MG: 20 INJECTION, SOLUTION EPIDURAL; INFILTRATION; INTRACAUDAL; PERINEURAL at 07:50

## 2017-06-21 RX ADMIN — FAMOTIDINE 20 MG: 10 INJECTION, SOLUTION INTRAVENOUS at 12:00

## 2017-06-21 RX ADMIN — Medication 2 MG: at 11:42

## 2017-06-21 RX ADMIN — SODIUM CHLORIDE: 9 INJECTION, SOLUTION INTRAVENOUS at 07:30

## 2017-06-21 RX ADMIN — Medication 2 MG: at 17:55

## 2017-06-21 RX ADMIN — MIDAZOLAM HYDROCHLORIDE 1.5 MG: 1 INJECTION, SOLUTION INTRAMUSCULAR; INTRAVENOUS at 07:10

## 2017-06-21 RX ADMIN — CEFAZOLIN 2 G: 1 INJECTION, POWDER, FOR SOLUTION INTRAMUSCULAR; INTRAVENOUS; PARENTERAL at 22:13

## 2017-06-21 RX ADMIN — ROCURONIUM BROMIDE 40 MG: 10 INJECTION, SOLUTION INTRAVENOUS at 07:50

## 2017-06-21 RX ADMIN — SODIUM CHLORIDE 5 MG/HR: 900 INJECTION, SOLUTION INTRAVENOUS at 19:05

## 2017-06-21 RX ADMIN — Medication 10 ML: at 21:11

## 2017-06-21 RX ADMIN — OXYCODONE HYDROCHLORIDE 5 MG: 5 TABLET ORAL at 15:57

## 2017-06-21 RX ADMIN — PROTAMINE SULFATE 80 MG: 10 INJECTION, SOLUTION INTRAVENOUS at 09:34

## 2017-06-21 RX ADMIN — PROPOFOL 50 MG: 10 INJECTION, EMULSION INTRAVENOUS at 07:50

## 2017-06-21 RX ADMIN — Medication 2 G: at 08:15

## 2017-06-21 RX ADMIN — HEPARIN SODIUM 8000 UNITS: 1000 INJECTION, SOLUTION INTRAVENOUS; SUBCUTANEOUS at 09:14

## 2017-06-21 RX ADMIN — PROPOFOL 40 MCG/KG/MIN: 10 INJECTION, EMULSION INTRAVENOUS at 09:53

## 2017-06-21 RX ADMIN — FENTANYL CITRATE 50 MCG: 50 INJECTION, SOLUTION INTRAMUSCULAR; INTRAVENOUS at 06:58

## 2017-06-21 RX ADMIN — Medication 2 MG: at 12:50

## 2017-06-21 RX ADMIN — POTASSIUM CHLORIDE 20 MEQ: 400 INJECTION, SOLUTION INTRAVENOUS at 14:25

## 2017-06-21 RX ADMIN — CHLORHEXIDINE GLUCONATE 10 ML: 1.2 RINSE ORAL at 20:35

## 2017-06-21 RX ADMIN — ACETAMINOPHEN 1000 MG: 10 INJECTION, SOLUTION INTRAVENOUS at 20:33

## 2017-06-21 RX ADMIN — Medication 4 MG: at 15:05

## 2017-06-21 RX ADMIN — CHLORHEXIDINE GLUCONATE 10 ML: 1.2 RINSE ORAL at 12:46

## 2017-06-21 RX ADMIN — POTASSIUM CHLORIDE 20 MEQ: 400 INJECTION, SOLUTION INTRAVENOUS at 13:26

## 2017-06-21 RX ADMIN — SODIUM CHLORIDE 10 ML/HR: 4.5 INJECTION, SOLUTION INTRAVENOUS at 11:14

## 2017-06-21 NOTE — OP NOTES
Transcatheter Aortic Valve Replacement  Indication  The valve was placed for severe aortic stenosis. Patient was at high surgical risk due to co-morbid conditions. Co-Surgeon: Leyla Helms M.D. Access  The valve was placed using a transapical approach. The 20F valve delivery sheath was placed in the apex via a surgical approach. 6 Cambodian sheaths were placed in the right femoral artery for angiography. Procedure  A 6 Fr sheath was placed in the right femoral artery for aortography. Prior to placement of the aortic valve, a left heart catheterization and aortogram were performed. A balloon aortic valvuloplasty was not performed prior to valve placement. A 23 mm S3 aortic valve was then deployed during rapid ventricular pacing, rate 180 bpm. Post-deployment balloon inflation was not performed. Cardiopulmonary bypass support was not used for hemodynamic support during the procedure. Hemodynamics  The pre-TAVR hemodynamic assessment confirmed severe aortic stenosis with a gradient of 18 mmHg mean. Pre: PA 42/28, /25, Ao 128/60, CAROLYNN 0.9  Post: PA 33/17, LV 95/20, Ao 95/50  The LVEDP was noted to be elevated and there was trivial aortic regurgitation. Post-deployment hemodynamics showed no transvalvular gradient and elevated LVEDP. Angiography  1. Aortic angiography pre intervention demonstrates severe AS and mild AI. 2. Aortic angiography post TAVR demonstrates the TAVR valve in good position with trace AI.     Conclusion:  Successful transapical transcatheter aortic valve replacement with trivial paravalvular leak. Post-deployment aortography showed the valve was in good position with trace aortic regurgitation. The coronary arteries were patent. At the completion of the case, catheters were removed. The valve delivery sheath was removed and hemostasis obtained by percutaneous closure device.  Other arterial and venous sheaths were removed and hemostasis obtained by manual compression for 15 minutes. Complications:  none. The patient was transferred to the ICU in stable condition. I was present for the entire procedure. I have edited the procedure note as appropriate.

## 2017-06-21 NOTE — PROGRESS NOTES
Problem: Cardiac Valve Surgery: Day of Surgery/Post-Op (Initiate SCIP measures for post-op care)  Goal: Activity/Safety  Outcome: Progressing Towards Goal  Still on bedrest, in considerable pain, but able to tolerate turning. Goal: Diagnostic Test/Procedures  Outcome: Progressing Towards Goal  Not on the glucostabilizer,post op labs done, called abnormal results to Taylor Rodriguez NP  Goal: Nutrition/Diet  Outcome: Progressing Towards Goal  Still NPO, attempted dysphagia screen but patient complained of painful swallowing. Goal: Medications  Outcome: Progressing Towards Goal  Currently on Cardene and main IV fluids  Goal: Respiratory  Outcome: Progressing Towards Goal  Extubated at 1220. On 4 LPM nasal cannula.

## 2017-06-21 NOTE — OP NOTES
Transcatheter Aortic Valve Replacement  Indication  The valve was placed for severe aortic stenosis. Patient was at high surgical risk due to co-morbid conditions. Co-Surgeon: Brian Hensley M.D. Access  The valve was placed using a transapical approach. The 20F valve delivery sheath was placed in the apex via a surgical approach. 6 Cameroonian sheaths were placed in the right femoral artery for angiography. Procedure  A 6 Fr sheath was placed in the right femoral artery for aortography. Prior to placement of the aortic valve, a left heart catheterization and aortogram were performed. A balloon aortic valvuloplasty was not performed prior to valve placement. A 23 mm S3 aortic valve was then deployed during rapid ventricular pacing, rate 180 bpm. Post-deployment balloon inflation was not performed. Cardiopulmonary bypass support was not used for hemodynamic support during the procedure. Hemodynamics  The pre-TAVR hemodynamic assessment confirmed severe aortic stenosis with a gradient of 18 mmHg mean. Pre: PA 42/28, /25, Ao 128/60, CAROLYNN 0.9  Post: PA 33/17, LV 95/20, Ao 95/50  The LVEDP was noted to be elevated and there was trivial aortic regurgitation. Post-deployment hemodynamics showed no transvalvular gradient and elevated LVEDP. Angiography  1. Aortic angiography pre intervention demonstrates severe AS and mild AI. 2. Aortic angiography post TAVR demonstrates the TAVR valve in good position with trace AI. Conclusion:  Successful transapical transcatheter aortic valve replacement with trivial paravalvular leak. Post-deployment aortography showed the valve was in good position with trace aortic regurgitation. The coronary arteries were patent. At the completion of the case, catheters were removed. The valve delivery sheath was removed and hemostasis obtained by percutaneous closure device.  Other arterial and venous sheaths were removed and hemostasis obtained by manual compression for 15 minutes. Complications:  none. The patient was transferred to the ICU in stable condition. I was present for the entire procedure. I have edited the procedure note as appropriate.

## 2017-06-21 NOTE — IP AVS SNAPSHOT
[address] 
[phone] Patient: Jazmine MRN: [id number] PPP:23/9/[birth year]90 You are allergic to the following Allergen Reactions Levofloxacin Swelling Codeine Unable to Obtain  
 makes patient cry Contrast Agent (Iodine) Unknown (comments) Dr. Miguel Huang told patient that she is allergic Recent Documentation Height Weight Breastfeeding? BMI Smoking Status 1.676 m 77.8 kg No 27.68 kg/m2 Never Smoker Emergency Contacts Name Discharge Info Relation Home Work Mobile Rosa Younger DISCHARGE CAREGIVER [3] Daughter [21] [phone] About your hospitalization You were admitted on:  June 21, 2017 You last received care in the:  [facility] CV SERVICES UNIT You were discharged on:  June 26, 2017 Unit phone number:  [phone] Why you were hospitalized Your primary diagnosis was:  Not on File Your diagnoses also included:  Nonrheumatic Aortic Valve Stenosis, Aortic Stenosis, S/P Tavr (Transcatheter Aortic Valve Replacement) Providers Seen During Your Hospitalizations Provider Role Specialty Primary office phone Opal Cruz MD Attending Provider Cardiothoracic Surgery [phone] Your Primary Care Physician (PCP) Primary Care Physician Office Phone Office Fax [phone] [facility] [phone] [facility] [phone] Follow-up Information Follow up With Details Comments Contact Info [facility] Call please call to make your cardiac rehab appointment. [phone] [address] [place] [address]  Ref[facility] to [facility]. Service to begin the day after discharge. Please call the agency if no contact by 12 noon the day after discharge. [phone] Stephanie Nelson MD   [address] [facility] [address] [facility] [place] [phone] Your Appointments Friday June 30, 2017 10:00 AM EDT  
POST OP with Connor Roach NP [facility] - [address] [place] [address] ([address] [place] [address] Road) [address] [place], [place] [address] [phone]  
[phone] Monday July 24, 2017 12:30 PM EDT  
POST OP with Connor Roach NP [facility] - [address] [place] [address] ([address] [place] [address]) [address] [place] [address] [address] [phone]  
[phone] Monday July 24, 2017  1:00 PM EDT  
POST OP with Serafin Tsang MD  
[facility] - [address] [place] [address] ([address] [place] [address]) [address] [place] [address] [address] [phone]  
[phone] Current Discharge Medication List  
  
START taking these medications Dose & Instructions Dispensing Information Comments Morning Noon Evening Bedtime  
 senna-docusate 8.6-50 mg per tablet Commonly known as:  Harika Sepulveda Your last dose was: Your next dose is:    
   
   
 Dose:  1 Tab Take 1 Tab by mouth two (2) times daily as needed for Constipation for up to 60 days. Quantity:  60 Tab Refills:  1  
     
   
   
   
  
 traMADol 50 mg tablet Commonly known as:  ULTRAM  
   
Your last dose was: Your next dose is:    
   
   
 Dose:  50 mg Take 1 Tab by mouth every six (6) hours as needed. Max Daily Amount: 200 mg. Quantity:  30 Tab Refills:  0 CONTINUE these medications which have NOT CHANGED Dose & Instructions Dispensing Information Comments Morning Noon Evening Bedtime  
 aspirin delayed-release 81 mg tablet Your last dose was: Your next dose is:    
   
   
 Dose:  81 mg Take 81 mg by mouth daily. Refills:  0  
     
   
   
   
  
 atenolol 25 mg tablet Commonly known as:  TENORMIN Your last dose was: Your next dose is:    
   
   
 Dose:  25 mg Take 25 mg by mouth daily. Refills:  0 Biotin 2,500 mcg Cap Your last dose was: Your next dose is:    
   
   
 Dose:  1 Cap Take 1 Cap by mouth nightly. Refills:  0  
     
   
   
   
  
 cholecalciferol 1,000 unit Cap Commonly known as:  VITAMIN D3 Your last dose was: Your next dose is:    
   
   
 Dose:  1000 Units Take 1,000 Units by mouth nightly. Refills:  0  
     
   
   
   
  
 CRESTOR 10 mg tablet Generic drug:  rosuvastatin Your last dose was: Your next dose is:    
   
   
 Dose:  40 mg Take 40 mg by mouth nightly. Refills:  0  
     
   
   
   
  
 cyanocobalamin 500 mcg tablet Commonly known as:  VITAMIN B12 Your last dose was: Your next dose is:    
   
   
 Dose:  500 mcg Take 500 mcg by mouth nightly. Refills:  0  
     
   
   
   
  
 dilTIAZem  mg Tb24 tablet Commonly known as:  CARDIZEM LA Your last dose was: Your next dose is:    
   
   
 Dose:  180 mg Take 180 mg by mouth nightly. Refills:  0  
     
   
   
   
  
 furosemide 20 mg tablet Commonly known as:  LASIX Your last dose was: Your next dose is: Take  by mouth daily. Refills:  0  
     
   
   
   
  
 levothyroxine 125 mcg tablet Commonly known as:  SYNTHROID Your last dose was: Your next dose is: Take  by mouth Daily (before breakfast). Refills:  0  
     
   
   
   
  
 nitroglycerin 0.4 mg SL tablet Commonly known as:  NITROSTAT Your last dose was: Your next dose is:    
   
   
 Dose:  0.4 mg  
1 Tab by SubLINGual route as needed for Chest Pain. Quantity:  1 Bottle Refills:  prn  
     
   
   
   
  
 potassium chloride SR 10 mEq tablet Commonly known as:  KLOR-CON 10 Your last dose was: Your next dose is:    
   
   
 Dose:  20 mEq Take 20 mEq by mouth daily. Refills:  0  
     
   
   
   
  
 sertraline 50 mg tablet Commonly known as:  ZOLOFT  
 Your last dose was: Your next dose is: TAKE ONE TABLET BY MOUTH EVERY DAY Quantity:  90 Tab Refills:  1 TYLENOL 325 mg tablet Generic drug:  acetaminophen Your last dose was: Your next dose is:    
   
   
 Dose:  650 mg Take 650 mg by mouth every four (4) hours as needed for Pain. Refills:  0  
     
   
   
   
  
 warfarin 7.5 mg tablet Commonly known as:  COUMADIN Your last dose was: Your next dose is:    
   
   
 Dose:  7.5 mg Take 7.5 mg by mouth daily. As directed Refills:  0 STOP taking these medications   
 cephALEXin 500 mg capsule Commonly known as:  KEFLEX  
   
  
 diphenhydrAMINE 50 mg tablet Commonly known as:  BENADRYL  
   
  
 enoxaparin 80 mg/0.8 mL injection Commonly known as:  LOVENOX  
   
  
 isosorbide mononitrate ER 30 mg tablet Commonly known as:  IMDUR  
   
  
 predniSONE 50 mg tablet Commonly known as:  Ventura Sahu Where to Get Your Medications Information on where to get these meds will be given to you by the nurse or doctor. ! Ask your nurse or doctor about these medications  
  senna-docusate 8.6-50 mg per tablet  
 traMADol 50 mg tablet Discharge Instructions [facility] 
 
[address] [place] [address] [address] [facility] Office- [phone]  Fax- [phone]       Office- [phone]  Fax- [phone] 
_____________________________________________________________ Dr. Lupita Prader Dr. Willma Sane Dr. Floydene Kempf Dr. Harley Ramal Valma Bis FNP    Bridger Tabor PA-C Name:[facility] & Date: Procedure(s): 
 TRANSCATHETER AORTIC VALVE REPLACEMENT, TRANSAPICAL APPROACH, 23MM S3 VALVE Discharge Date:  6/26/17 MEDICATIONS: 
Please refer to your After Visit Summary for your medication list.  
DO NOT TAKE ANY MEDICATIONS THAT ARE NOT ON THIS LIST INSTRUCTIONS: 
NO SMOKING OR TOBACCO PRODUCTS Do not follow the activity/exercise instructions in your discharge book given to you as an inpatient. You have no activity restrictions. You may shower. Wash all incisions twice daily with mild soap and water. No lotions, ointments or powder. Call the office immediately for any redness, swelling, or drainage from your incision. Take your temperature daily and call for a temperature of 101 degrees or higher or for any symptoms that make you think you have and infection. Weigh yourself each morning. Call if you gain more than 5 pounds in 48 hours. Use the incentive spirometer 6-8 times a day-10 breaths each time. Walk several hundred feet several times daily. DIET Eat an American Heart Association diet. If you are having trouble with your appetite, eat what you can. Try eating small, frequent meals throughout the day. ACTIVITY 1. You have no activity restrictions. You may resume your daily activities at home, based on your comfort level. You may also drive. FOLLOW UP 
1. Your first follow up appointment will be on 6/28/17 at 10:00 am. Our office is located in [address] [place] [address] on floor G-5. Your second follow up appointment will be in four weeks, on 7/24/17 at 1:00 pm. You will need to have an ECHO prior to your appointment time. Our office will set that up for you. Please call our office at [phone] if you are unable to make either one of these appointments. 2. You will be receiving a call before your 3 day follow up appointment to begin cardiac rehab.  They are located in the [address] [place] [address] on [address] and [place]. Their phone number is [phone]. Please call if you have not been contacted 2-3 weeks after discharge from the hospital.6/28 
3. We will make an appointment for you with your cardiologist in 4-5 weeks. 4. Consult you primary care physician regarding your influenza &  
pneumovax vaccines. 5.   Please bring all medications with you to your appointment. Signature:___________________________________________________ Discharge Orders None Introducing [facility]! [facility] introduces [employer] patient portal. Now you can access parts of your medical record, email your doctor's office, and request medication refills online. 1. In your internet browser, go to [email or web address] 2. Click on the First Time User? Click Here link in the Sign In box. You will see the New Member Sign Up page. 3. Enter your Autoniq Access Code exactly as it appears below. You will not need to use this code after youve completed the sign-up process. If you do not sign up before the expiration date, you must request a new code. · Autoniq Access Code: [email or web address] Expires: 8/8/2017  3:29 PM 
 
4. Enter the last four digits of your Social Security Number (xxxx) and Date of Birth (mm/dd/yyyy) as indicated and click Submit. You will be taken to the next sign-up page. 5. Create a [employer] System[employer] ID. This will be your Autoniq login ID and cannot be changed, so think of one that is secure and easy to remember. 6. Create a Autoniq password. You can change your password at any time. 7. Enter your Password Reset Question and Answer. This can be used at a later time if you forget your password. 8. Enter your e-mail address. You will receive e-mail notification when new information is available in [address] 9. Click Sign Up. You can now view and download portions of your medical record. 10. Click the Download Summary menu link to download a portable copy of your medical information. If you have questions, please visit the Frequently Asked Questions section of the MyChart website. Remember, MyChart is NOT to be used for urgent needs. For medical emergencies, dial 911. Now available from your iPhone and Android! General Information Please provide this summary of care documentation to your next provider. Patient Signature:  ____________________________________________________________ Date:  ____________________________________________________________  
  
Leonor Burn Provider Signature:  ____________________________________________________________ Date:  ____________________________________________________________

## 2017-06-21 NOTE — H&P
Date of Surgery Update: [facility] was seen and examined. History and physical has been reviewed. The patient has been examined.  There have been no significant clinical changes since the completion of the originally dated History and Physical.    Signed By: NAYELI Bryant     June 21, 2017 7:13 AM

## 2017-06-21 NOTE — PROGRESS NOTES
Renal Dosing/Monitoring  Medication: Famotidine  Current regimen:  20 mg every 12 hr  No results for input(s): CREA, BUN in the last 72 hours.   Estimated CrCl:  <50  ml/min  Plan: Change to q24 hr daily per renal protocol     **close after initial review

## 2017-06-21 NOTE — PROGRESS NOTES
Cardiac Surgery Care Coordinator- Met with the family of Daly Reza, introduced role of the Cardiac Surgery Co- Nurse. Reviewed plan of care and day of surgery expectations. Provided family with a contact number and an update from OR. Encouraged family to verbalize and emotional support given. Will continue to update throughout the day. 0930- Met with family of Daly Reza, provided family with update. Family without questions or concerns at this time. Will continue to follow for educational and emotional needs. 1035- Met with Na Marquez's family and Britta Selby. Update given, Encouraged family to verbalize and offered emotional support. 36- Escorted family to the fourth floor waiting room, bedside nurse aware of family's location. Will continue to follow. 56- Escorted family to the bedside in CVICU, reviewed plan of care and offered emotional support. Tomasa Castaneda  ( daughter) stated she will return later in the day.   Kathleen Desouza RN

## 2017-06-21 NOTE — ANESTHESIA PROCEDURE NOTES
Central Line and Pulmonary Artery Catheter Placement    Start time: 6/21/2017 7:10 AM  End time: 6/21/2017 7:22 AM  Performed by: Lillian Garcia  Authorized by: David BOWMAN     Indications: vascular access, central pressure monitoring and need for vasopressors  Preanesthetic Checklist: patient identified, risks and benefits discussed, anesthesia consent, site marked, patient being monitored and timeout performed      Pre-procedure: All elements of maximal sterile barrier technique followed?  Yes    Maximal barrier precautions followed, 2% Chlorhexidine for cutaneous antisepsis, Hand hygiene performed prior to catheter insertion and Ultrasound guidance    Sterile Ultrasound Technique followed?: Yes          Procedure:   Prep:  Chlorhexidine  Location:  Internal jugular  Orientation:  Right  Patient position:  Trendelenburg  Catheter type:  Double lumen  Catheter size:  9 Fr  Catheter length:  12 cm  Number of attempts:  1  Successful placement: Yes      Assessment:   Post-procedure:  Catheter secured and sterile dressing with CHG applied  Assessment:  Blood return through all ports and free fluid flow  Insertion:  Uncomplicated  Patient tolerance:  Patient tolerated the procedure well with no immediate complications  9 Fr MAC and 8 Fr CCO PAC

## 2017-06-21 NOTE — IP AVS SNAPSHOT
Current Discharge Medication List  
  
START taking these medications Dose & Instructions Dispensing Information Comments Morning Noon Evening Bedtime  
 senna-docusate 8.6-50 mg per tablet Commonly known as:  Trey Walters Your last dose was: Your next dose is:    
   
   
 Dose:  1 Tab Take 1 Tab by mouth two (2) times daily as needed for Constipation for up to 60 days. Quantity:  60 Tab Refills:  1  
     
   
   
   
  
 traMADol 50 mg tablet Commonly known as:  ULTRAM  
   
Your last dose was: Your next dose is:    
   
   
 Dose:  50 mg Take 1 Tab by mouth every six (6) hours as needed. Max Daily Amount: 200 mg. Quantity:  30 Tab Refills:  0 CONTINUE these medications which have NOT CHANGED Dose & Instructions Dispensing Information Comments Morning Noon Evening Bedtime  
 aspirin delayed-release 81 mg tablet Your last dose was: Your next dose is:    
   
   
 Dose:  81 mg Take 81 mg by mouth daily. Refills:  0  
     
   
   
   
  
 atenolol 25 mg tablet Commonly known as:  TENORMIN Your last dose was: Your next dose is:    
   
   
 Dose:  25 mg Take 25 mg by mouth daily. Refills:  0 Biotin 2,500 mcg Cap Your last dose was: Your next dose is:    
   
   
 Dose:  1 Cap Take 1 Cap by mouth nightly. Refills:  0  
     
   
   
   
  
 cholecalciferol 1,000 unit Cap Commonly known as:  VITAMIN D3 Your last dose was: Your next dose is:    
   
   
 Dose:  1000 Units Take 1,000 Units by mouth nightly. Refills:  0  
     
   
   
   
  
 CRESTOR 10 mg tablet Generic drug:  rosuvastatin Your last dose was: Your next dose is:    
   
   
 Dose:  40 mg Take 40 mg by mouth nightly. Refills:  0  
     
   
   
   
  
 cyanocobalamin 500 mcg tablet Commonly known as:  VITAMIN B12  
   
 Your last dose was: Your next dose is:    
   
   
 Dose:  500 mcg Take 500 mcg by mouth nightly. Refills:  0  
     
   
   
   
  
 dilTIAZem  mg Tb24 tablet Commonly known as:  CARDIZEM LA Your last dose was: Your next dose is:    
   
   
 Dose:  180 mg Take 180 mg by mouth nightly. Refills:  0  
     
   
   
   
  
 furosemide 20 mg tablet Commonly known as:  LASIX Your last dose was: Your next dose is: Take  by mouth daily. Refills:  0  
     
   
   
   
  
 levothyroxine 125 mcg tablet Commonly known as:  SYNTHROID Your last dose was: Your next dose is: Take  by mouth Daily (before breakfast). Refills:  0  
     
   
   
   
  
 nitroglycerin 0.4 mg SL tablet Commonly known as:  NITROSTAT Your last dose was: Your next dose is:    
   
   
 Dose:  0.4 mg  
1 Tab by SubLINGual route as needed for Chest Pain. Quantity:  1 Bottle Refills:  prn  
     
   
   
   
  
 potassium chloride SR 10 mEq tablet Commonly known as:  KLOR-CON 10 Your last dose was: Your next dose is:    
   
   
 Dose:  20 mEq Take 20 mEq by mouth daily. Refills:  0  
     
   
   
   
  
 sertraline 50 mg tablet Commonly known as:  ZOLOFT Your last dose was: Your next dose is: TAKE ONE TABLET BY MOUTH EVERY DAY Quantity:  90 Tab Refills:  1 TYLENOL 325 mg tablet Generic drug:  acetaminophen Your last dose was: Your next dose is:    
   
   
 Dose:  650 mg Take 650 mg by mouth every four (4) hours as needed for Pain. Refills:  0  
     
   
   
   
  
 warfarin 7.5 mg tablet Commonly known as:  COUMADIN Your last dose was: Your next dose is:    
   
   
 Dose:  7.5 mg Take 7.5 mg by mouth daily. As directed Refills:  0 STOP taking these medications cephALEXin 500 mg capsule Commonly known as:  KEFLEX  
   
  
 diphenhydrAMINE 50 mg tablet Commonly known as:  BENADRYL  
   
  
 enoxaparin 80 mg/0.8 mL injection Commonly known as:  LOVENOX  
   
  
 isosorbide mononitrate ER 30 mg tablet Commonly known as:  IMDUR  
   
  
 predniSONE 50 mg tablet Commonly known as:  Terrence Joseph Where to Get Your Medications Information on where to get these meds will be given to you by the nurse or doctor. ! Ask your nurse or doctor about these medications  
  senna-docusate 8.6-50 mg per tablet  
 traMADol 50 mg tablet

## 2017-06-21 NOTE — ANESTHESIA PREPROCEDURE EVALUATION
Anesthetic History   No history of anesthetic complications            Review of Systems / Medical History  Patient summary reviewed, nursing notes reviewed and pertinent labs reviewed    Pulmonary  Within defined limits                 Neuro/Psych   Within defined limits           Cardiovascular    Hypertension  Valvular problems/murmurs: aortic stenosis        CAD and CABG    Exercise tolerance: <4 METS     GI/Hepatic/Renal         Renal disease: CRI       Endo/Other      Hypothyroidism: well controlled       Other Findings              Physical Exam    Airway  Mallampati: II  TM Distance: 4 - 6 cm  Neck ROM: normal range of motion   Mouth opening: Normal     Cardiovascular    Rhythm: regular  Rate: normal    Murmur, Aortic area     Dental    Dentition: Full upper dentures and Lower dentition intact     Pulmonary  Breath sounds clear to auscultation               Abdominal  GI exam deferred       Other Findings            Anesthetic Plan    ASA: 4  Anesthesia type: general    Monitoring Plan: Arterial line, BIS, CVP, Sal[place]-Rodo and YESI    Post procedure ventilation   Induction: Intravenous  Anesthetic plan and risks discussed with: Patient

## 2017-06-21 NOTE — PROGRESS NOTES
1500- bedside report and drips verified. Patient in bed complaining of left flank discomfort. Uneventful shift. Patient confused to situation and needs frequent reminders that surgery was done today and that she does not need to leave the unit. After re-orienting patient is appropriate. 2000- Bedside and Verbal shift change report given to Jh RN (oncoming nurse) by Silvino Jennings RN (offgoing nurse). Report included the following information SBAR, Recent Results and Cardiac Rhythm A Fib.

## 2017-06-21 NOTE — ANESTHESIA PROCEDURE NOTES
Arterial Line Placement    Start time: 6/21/2017 7:01 AM  End time: 6/21/2017 7:06 AM  Performed by: Sloane Anderson  Authorized by: Molly BOWMAN     Pre-Procedure  Indications:  Arterial pressure monitoring  Preanesthetic Checklist: patient identified, risks and benefits discussed, anesthesia consent, site marked, patient being monitored, timeout performed and patient being monitored      Procedure:   Prep:  Chlorhexidine  Seldinger Technique?: Yes    Orientation:  Left  Location:  Radial artery  Catheter size:  20 G  Number of attempts:  1  Cont Cardiac Output Sensor: No      Assessment:   Post-procedure:  Line secured and sterile dressing applied  Patient Tolerance:  Patient tolerated the procedure well with no immediate complications

## 2017-06-21 NOTE — CARDIO/PULMONARY
Cardiac Wellness: Trans-catheter education folder to the bedside of [facility]. [facility] Through Surgery updated. Will follow for education needs.

## 2017-06-21 NOTE — PERIOP NOTES
Patient interviewed in Main Operating Room/Cardiac Surgery, Pre-op Holding. Patient identifiers verified with name and date of birth. Procedure verified with patient. Consent forms verified. Allergies verified. Patient informed of procedure and plan of care. Questions answered with review. Patient voiced understanding of procedure and plan of care. Patient arrived to the operating room via stretcher. All wheels locked and patient transferred to the OR table with the assistance of four people. MTRE warming wrap present on OR table. Temp set at 37 C and monitored by perfusion. Unit # [id number]. After the induction of anesthesia and intubation, a 16 fr temp sensing barney catheter was placed without difficulty. 10 ml of sterile water was used to inflate the balloon. Clear, yellow urine return noted. Urine output monitored by anesthesia. Fluids:   0.9 % Sodium Chloride:   PRN irrigation    Sterile water:   1000 ml in splash basin for instrument care.

## 2017-06-21 NOTE — PROGRESS NOTES
1045 - Arrived. Intubated, sedated. Dr. Opal Armijo, anesthesia, Dr. Angelo Mattson, Cardiac Surgeon, Perfusionist, in attendance. 1050 - Report given at bedside. 1126 - Patient breathing 16-18 times per minute, TV of 517-525, with occasional small breaths. Called RT to decrease rate to 6. Propofol off at this time. Patient starting to swallow around the ETT. 1138 - Patient starting to rouse, squeezes hands, wiggles toes to command, very groggy, opens eyes at times but does not focus and track. Nodded when asked     1142 - Morphine 2 mg given for pain IV.    1200 - Patient restless, tries to lift head off the bed, lifts arms off the bed. Talked calmly to her, she nods appropriately but continues to be restless. 1204 - Placed on CPAP. I spoke to the patient and informed her that she will need to take deep breaths while on the spontaneous setting, we will be doing a blood gas, and depending on results, will be pulling the breathing tube. She nodded and seemed to understand. 1220 - Extubated, was able to cough forcefully, placed on 4 LPM nasal cannula. 1320 - Noted platelets at 99, compared to 6/7 lab draw, 122. Also, patient still in a lot of pain. Will attempt a swallow screen to see if she can adequately swallow and maybe give percocet. 1330 - Swallow screen stopped due to patient complaining of pain upon swallowing applesauce. Paged Cardiac Surgery NP, Eleonora Patiño. [address] called back, informed her of platelets, and of patient's pain management and steps taken as described above. She stated to just monitor Platelet level for now. She will order IV tylenol. 1420 - Patient finally dozing off, seems more comfortable, awakens easily but no longer groaning and in pain. 1500 - Bedside shift change report given to [facility] [employer] (oncoming nurse) by Ila Claude (offgoing nurse).  Report included the following information SBAR, Kardex, OR Summary, Intake/Output, MAR, Accordion, Recent Results and Cardiac Rhythm Afib.

## 2017-06-22 ENCOUNTER — APPOINTMENT (OUTPATIENT)
Dept: GENERAL RADIOLOGY | Age: 82
DRG: 267 | End: 2017-06-22
Attending: NURSE PRACTITIONER
Payer: MEDICARE

## 2017-06-22 PROBLEM — Z95.2 S/P TAVR (TRANSCATHETER AORTIC VALVE REPLACEMENT): Status: ACTIVE | Noted: 2017-06-22

## 2017-06-22 LAB
ABO + RH BLD: NORMAL
ALBUMIN SERPL BCP-MCNC: 3.2 G/DL (ref 3.5–5)
ALBUMIN/GLOB SERPL: 1.2 {RATIO} (ref 1.1–2.2)
ALP SERPL-CCNC: 56 U/L (ref 45–117)
ALT SERPL-CCNC: 29 U/L (ref 12–78)
ANION GAP BLD CALC-SCNC: 6 MMOL/L (ref 5–15)
AST SERPL W P-5'-P-CCNC: 36 U/L (ref 15–37)
ATRIAL RATE: 326 BPM
BASOPHILS # BLD AUTO: 0 K/UL (ref 0–0.1)
BASOPHILS # BLD: 0 % (ref 0–1)
BILIRUB SERPL-MCNC: 0.3 MG/DL (ref 0.2–1)
BLD PROD TYP BPU: NORMAL
BLD PROD TYP BPU: NORMAL
BLOOD GROUP ANTIBODIES SERPL: NORMAL
BPU ID: NORMAL
BPU ID: NORMAL
BUN SERPL-MCNC: 25 MG/DL (ref 6–20)
BUN/CREAT SERPL: 29 (ref 12–20)
CALCIUM SERPL-MCNC: 8.4 MG/DL (ref 8.5–10.1)
CALCULATED R AXIS, ECG10: -49 DEGREES
CALCULATED T AXIS, ECG11: 135 DEGREES
CHLORIDE SERPL-SCNC: 113 MMOL/L (ref 97–108)
CO2 SERPL-SCNC: 23 MMOL/L (ref 21–32)
CREAT SERPL-MCNC: 0.87 MG/DL (ref 0.55–1.02)
CROSSMATCH RESULT,%XM: NORMAL
CROSSMATCH RESULT,%XM: NORMAL
DIAGNOSIS, 93000: NORMAL
EOSINOPHIL # BLD: 0 K/UL (ref 0–0.4)
EOSINOPHIL NFR BLD: 0 % (ref 0–7)
ERYTHROCYTE [DISTWIDTH] IN BLOOD BY AUTOMATED COUNT: 18.5 % (ref 11.5–14.5)
GLOBULIN SER CALC-MCNC: 2.6 G/DL (ref 2–4)
GLUCOSE SERPL-MCNC: 140 MG/DL (ref 65–100)
HCT VFR BLD AUTO: 31 % (ref 35–47)
HGB BLD-MCNC: 9.9 G/DL (ref 11.5–16)
LYMPHOCYTES # BLD AUTO: 4 % (ref 12–49)
LYMPHOCYTES # BLD: 0.4 K/UL (ref 0.8–3.5)
MAGNESIUM SERPL-MCNC: 2.4 MG/DL (ref 1.6–2.4)
MCH RBC QN AUTO: 26.7 PG (ref 26–34)
MCHC RBC AUTO-ENTMCNC: 31.9 G/DL (ref 30–36.5)
MCV RBC AUTO: 83.6 FL (ref 80–99)
MONOCYTES # BLD: 1.1 K/UL (ref 0–1)
MONOCYTES NFR BLD AUTO: 9 % (ref 5–13)
NEUTS SEG # BLD: 9.9 K/UL (ref 1.8–8)
NEUTS SEG NFR BLD AUTO: 87 % (ref 32–75)
PLATELET # BLD AUTO: 95 K/UL (ref 150–400)
POTASSIUM SERPL-SCNC: 4.4 MMOL/L (ref 3.5–5.1)
PROT SERPL-MCNC: 5.8 G/DL (ref 6.4–8.2)
Q-T INTERVAL, ECG07: 394 MS
QRS DURATION, ECG06: 108 MS
QTC CALCULATION (BEZET), ECG08: 481 MS
RBC # BLD AUTO: 3.71 M/UL (ref 3.8–5.2)
SODIUM SERPL-SCNC: 142 MMOL/L (ref 136–145)
SPECIMEN EXP DATE BLD: NORMAL
STATUS OF UNIT,%ST: NORMAL
STATUS OF UNIT,%ST: NORMAL
UNIT DIVISION, %UDIV: 0
UNIT DIVISION, %UDIV: 0
VENTRICULAR RATE, ECG03: 90 BPM
WBC # BLD AUTO: 11.3 K/UL (ref 3.6–11)

## 2017-06-22 PROCEDURE — 65660000000 HC RM CCU STEPDOWN

## 2017-06-22 PROCEDURE — 77030032490 HC SLV COMPR SCD KNE COVD -B

## 2017-06-22 PROCEDURE — G8978 MOBILITY CURRENT STATUS: HCPCS

## 2017-06-22 PROCEDURE — 74011250637 HC RX REV CODE- 250/637: Performed by: NURSE PRACTITIONER

## 2017-06-22 PROCEDURE — 77010033678 HC OXYGEN DAILY

## 2017-06-22 PROCEDURE — 74011250637 HC RX REV CODE- 250/637: Performed by: THORACIC SURGERY (CARDIOTHORACIC VASCULAR SURGERY)

## 2017-06-22 PROCEDURE — 71010 XR CHEST PORT: CPT

## 2017-06-22 PROCEDURE — G8979 MOBILITY GOAL STATUS: HCPCS

## 2017-06-22 PROCEDURE — 97161 PT EVAL LOW COMPLEX 20 MIN: CPT

## 2017-06-22 PROCEDURE — 97165 OT EVAL LOW COMPLEX 30 MIN: CPT

## 2017-06-22 PROCEDURE — 85025 COMPLETE CBC W/AUTO DIFF WBC: CPT | Performed by: NURSE PRACTITIONER

## 2017-06-22 PROCEDURE — 80053 COMPREHEN METABOLIC PANEL: CPT | Performed by: NURSE PRACTITIONER

## 2017-06-22 PROCEDURE — 83735 ASSAY OF MAGNESIUM: CPT | Performed by: NURSE PRACTITIONER

## 2017-06-22 PROCEDURE — 36415 COLL VENOUS BLD VENIPUNCTURE: CPT | Performed by: NURSE PRACTITIONER

## 2017-06-22 PROCEDURE — 74011250636 HC RX REV CODE- 250/636: Performed by: NURSE PRACTITIONER

## 2017-06-22 PROCEDURE — 93005 ELECTROCARDIOGRAM TRACING: CPT

## 2017-06-22 RX ORDER — WARFARIN SODIUM 5 MG/1
5 TABLET ORAL ONCE
Status: COMPLETED | OUTPATIENT
Start: 2017-06-22 | End: 2017-06-22

## 2017-06-22 RX ORDER — DILTIAZEM HYDROCHLORIDE 180 MG/1
180 CAPSULE, COATED, EXTENDED RELEASE ORAL
Status: DISCONTINUED | OUTPATIENT
Start: 2017-06-22 | End: 2017-06-26 | Stop reason: HOSPADM

## 2017-06-22 RX ORDER — SODIUM CHLORIDE 0.9 % (FLUSH) 0.9 %
5-10 SYRINGE (ML) INJECTION AS NEEDED
Status: DISCONTINUED | OUTPATIENT
Start: 2017-06-22 | End: 2017-06-26 | Stop reason: HOSPADM

## 2017-06-22 RX ORDER — TRAMADOL HYDROCHLORIDE 50 MG/1
100 TABLET ORAL
Status: DISCONTINUED | OUTPATIENT
Start: 2017-06-22 | End: 2017-06-26 | Stop reason: HOSPADM

## 2017-06-22 RX ORDER — SODIUM CHLORIDE 0.9 % (FLUSH) 0.9 %
5-10 SYRINGE (ML) INJECTION EVERY 8 HOURS
Status: DISCONTINUED | OUTPATIENT
Start: 2017-06-22 | End: 2017-06-26 | Stop reason: HOSPADM

## 2017-06-22 RX ADMIN — Medication 10 ML: at 21:10

## 2017-06-22 RX ADMIN — CEFAZOLIN 2 G: 1 INJECTION, POWDER, FOR SOLUTION INTRAMUSCULAR; INTRAVENOUS; PARENTERAL at 11:40

## 2017-06-22 RX ADMIN — Medication 10 ML: at 06:12

## 2017-06-22 RX ADMIN — DILTIAZEM HYDROCHLORIDE 180 MG: 180 CAPSULE, EXTENDED RELEASE ORAL at 20:23

## 2017-06-22 RX ADMIN — Medication 400 MG: at 20:23

## 2017-06-22 RX ADMIN — ROSUVASTATIN CALCIUM 40 MG: 40 TABLET, FILM COATED ORAL at 20:23

## 2017-06-22 RX ADMIN — LEVOTHYROXINE SODIUM 125 MCG: 125 TABLET ORAL at 07:13

## 2017-06-22 RX ADMIN — Medication 2 MG: at 01:26

## 2017-06-22 RX ADMIN — TRAMADOL HYDROCHLORIDE 100 MG: 50 TABLET, FILM COATED ORAL at 21:10

## 2017-06-22 RX ADMIN — Medication 400 MG: at 08:49

## 2017-06-22 RX ADMIN — SERTRALINE HYDROCHLORIDE 50 MG: 50 TABLET ORAL at 08:48

## 2017-06-22 RX ADMIN — CHLORHEXIDINE GLUCONATE 10 ML: 1.2 RINSE ORAL at 08:50

## 2017-06-22 RX ADMIN — Medication 10 ML: at 11:40

## 2017-06-22 RX ADMIN — Medication 1 MG: at 07:20

## 2017-06-22 RX ADMIN — FAMOTIDINE 20 MG: 20 TABLET ORAL at 08:48

## 2017-06-22 RX ADMIN — ASPIRIN 81 MG 81 MG: 81 TABLET ORAL at 08:49

## 2017-06-22 RX ADMIN — OXYCODONE HYDROCHLORIDE 10 MG: 5 TABLET ORAL at 07:13

## 2017-06-22 RX ADMIN — ACETAMINOPHEN 1000 MG: 10 INJECTION, SOLUTION INTRAVENOUS at 08:48

## 2017-06-22 RX ADMIN — WARFARIN SODIUM 5 MG: 5 TABLET ORAL at 17:09

## 2017-06-22 RX ADMIN — CEFAZOLIN 2 G: 1 INJECTION, POWDER, FOR SOLUTION INTRAMUSCULAR; INTRAVENOUS; PARENTERAL at 23:30

## 2017-06-22 RX ADMIN — FAMOTIDINE 20 MG: 20 TABLET ORAL at 20:23

## 2017-06-22 RX ADMIN — Medication 10 ML: at 13:06

## 2017-06-22 RX ADMIN — CEFAZOLIN 2 G: 1 INJECTION, POWDER, FOR SOLUTION INTRAMUSCULAR; INTRAVENOUS; PARENTERAL at 05:21

## 2017-06-22 RX ADMIN — Medication 20 ML: at 11:40

## 2017-06-22 RX ADMIN — ACETAMINOPHEN 1000 MG: 10 INJECTION, SOLUTION INTRAVENOUS at 03:04

## 2017-06-22 RX ADMIN — CEFAZOLIN 2 G: 1 INJECTION, POWDER, FOR SOLUTION INTRAMUSCULAR; INTRAVENOUS; PARENTERAL at 17:09

## 2017-06-22 RX ADMIN — ACETAMINOPHEN 650 MG: 325 TABLET, FILM COATED ORAL at 14:52

## 2017-06-22 RX ADMIN — DOCUSATE SODIUM AND SENNOSIDES 1 TABLET: 8.6; 5 TABLET, FILM COATED ORAL at 17:09

## 2017-06-22 RX ADMIN — DOCUSATE SODIUM AND SENNOSIDES 1 TABLET: 8.6; 5 TABLET, FILM COATED ORAL at 08:49

## 2017-06-22 NOTE — PROGRESS NOTES
Bedside shift change report given to [address] (oncoming nurse) by [facility] RN (offgoing nurse). Report included the following information SBAR, Kardex, Intake/Output, MAR and Recent Results.

## 2017-06-22 NOTE — PROCEDURES
[address] [facility] [address], [address]   YESI       Name:  Mitchell Son   MR#:  [id number]   :  1932   Account #:  [de-identified]    Date of Procedure:     Date of Adm:  2017       PROCEDURE: Diagnostic and procedural intraoperative   transesophageal echocardiogram examination. DEMOGRAPHIC INFORMATION: The patient is an 77-year-old   woman who presents to [facility] for a   transcatheter aortic valve replacement with the [place] 3   system via a transapical approach. The multiplane transesophageal echo was easily placed following   the induction of general endotracheal anesthesia. The   echocardiographic modalities employed include two-dimensional   transesophageal echo, color-flow Doppler, pulse wave Doppler, and   continuous wave Doppler. ECHOCARDIOGRAM EXAMINATION   AORTA: The ascending aorta , aortic arch and descending aorta are normal in size. There is no   evidence of dissection or mobile atheromatous plaques. There are   multiple 3 to 5 mm atheromatous lesions in the thoracic descending   aorta. VALVES   Aortic valve: The aortic annulus is calcified. There is severe aortic   stenosis with a calculated aortic valve area of 0.62 cm sq, with a peak   gradient of 50.6 and a mean gradient of 31.7. There is moderate central   aortic insufficiency. The valve is trileaflet. All 3 leaflets are calcified and   thickened with restriction in their motion. MITRAL VALVE: Mitral annulus is normal. There is no stenosis. There is   mild-to-moderate mitral regurgitation with normal leaflet morphology in   motion. TRICUSPID VALVE: The tricuspid annulus is normal. There is no   stenosis. There is mild tricuspid regurgitation with normal leaflet   morphology in motion. ATRIA: The right and left atrium are both mildly dilated. There is no   evidence of smoke thrombus or tumor. The left atrial appendage is free of   clot.  The atrial septum is normal.     VENTRICLES: Interventricular septum is hypertrophied, measuring   approximately 2 cm in thickness. There does not, however, appear to   be any obstruction of the left ventricular outflow tract. RIGHT VENTRICLE: The right ventricular cavity size is mildly dilated. There is no hypertrophy or thrombus and there is a mild reduction in   right ventricular systolic function. LEFT VENTRICLE: Left ventricular cavity size is normal. There is   concentric left ventricular hypertrophy with the anterior wall being   slightly thicker than the posterior wall. There is no evidence of thrombus   and there is normal left ventricular systolic function. Ejection fraction is   approximately 60%. REGIONAL FUNCTION: There are no regional wall motion   abnormalities. Pericardium is normal. Pleura are normal.     POST INTERVENTION FOLLOWUP STUDY: The intervention   performed is a 23 mm Herrera Cuca 3 transcatheter aortic valve   replacement via a transapical approach. There is a trace perivalvular   leak. There is a peak gradient of 6.9 and mean gradient of 4.3 across   the valve. There is a slight increase in the amount of tricuspid   regurgitation which is now 2+ and a slight improvement in mitral   regurgitation, which is now 1+.  There are no other changes on the post   Intervention exam.        MD SOPHIA Myles / AFTAB   D:  06/21/2017   20:38   T:  06/22/2017   06:36   Job #:  [id number]

## 2017-06-22 NOTE — PROGRESS NOTES
Problem: Discharge Planning  Goal: *Discharge to safe environment  Outcome: Progressing Towards Goal  Home with Home Health.

## 2017-06-22 NOTE — PROGRESS NOTES
1235: TRANSFER - IN REPORT:    Verbal report received from [facility],B-1 arjun rn(name) on [facility]  being received from cvicu(unit) for post procedure. Report consisted of patients Situation, Background, Assessment and   Recommendations(SBAR). Information from the following report(s) SBAR, Kardex, OR Summary, Intake/Output, MAR and Recent Results was reviewed with the receiving nurse. Opportunity for questions and clarification was provided. 1250: Pt arrived to unit tele placed and confirmed with monitor tech. 1930: Bedside and Verbal shift change report given to padma doshi rn (oncoming nurse) by kris park rn (offgoing nurse). Report included the following information SBAR, Kardex, OR Summary, Intake/Output, MAR and Recent Results.

## 2017-06-22 NOTE — PROGRESS NOTES
Care Management Interventions  PCP Verified by CM: Yes Vinnie Parham MD)  Mode of Transport at Discharge: Other (see comment)  Transition of Care Consult (CM Consult): [address]: No  Reason Outside [place]: Patient already serviced by other home care/hospice agency  Reidt Signup: No  Discharge Durable Medical Equipment: No  Physical Therapy Consult: Yes  Occupational Therapy Consult: Yes  Speech Therapy Consult: No  Current Support Network: Lives with Caregiver  Confirm Follow Up Transport: Family  Plan discussed with Pt/Family/Caregiver: Yes  Freedom of Choice Offered:  (N/A)  Discharge Location  Discharge Placement: Home with home health    CM met with patient. Patient lives with her daughter Aman Carrero. Patient has a son in [place] and a daughter in [place]. Patient reports good family /social support. Patient expects return to independent functioning given her baseline limitations. Patient confirmed PCP, health insurance, and prescription coverage. Transport to home will be provided by family. CM received consult for home health services. Patient currently receives services with [facility] Intake [phone]/Fax [phone]) and wants to resume services post discharge. CM spoke with [facility], who confirmed that she is a current patient. CM sent   referral via fax (not available in [employer]) to [facility].  CM updated AVS.

## 2017-06-22 NOTE — CDMP QUERY
Thank you for documenting the diagnosis of Hx of  A-fib for this patient. Please clarify if this diagnosis could be further specified as:     =>Paroxysmal  =>Persistent  =>Chronic  =>Permanent  =>Other explanations of clinical findings  =>Unable to Determine    Pt presentation: Noted in A-fib on monitor with noted documentation to resume Coumadin. Please document your response indicating your clinical opinion in your progress notes and discharge summary.     Thank you,     Artem Garcia RN, BSN, [facility], [address]  [phone]

## 2017-06-22 NOTE — CARDIO/PULMONARY
Cardiac Wellness: [facility] cardiac rehab contact information placed on the AVS with instructions to call to make an appointment for cardiac rehab.  Chloe Aleman RN

## 2017-06-22 NOTE — PROGRESS NOTES
NUTRITION       Nutrition screening referral was triggered based on results obtained during nursing admission assessment for 14-23# wt loss. The patient's chart was reviewed and nutrition assessment is not indicated at this time. Some wt loss noted but also with increase in diuretic prior to admit with improvements in BLLE edema per office visit notes. Wt WNL. Wt Readings from Last 10 Encounters:   06/22/17 72.6 kg (160 lb 0.9 oz)   06/07/17 76.5 kg (168 lb 10.4 oz)   05/31/17 76.4 kg (168 lb 8 oz)   05/16/17 75.2 kg (165 lb 12.6 oz)   05/10/17 76.4 kg (168 lb 8 oz)     S/p TAVR for aortic stenosis yesterday. Diet advanced with fair appetite per RN documentation. Will continue to follow for PO intake with for rescreen as indicated. Thank you.     Michelle Leal RD

## 2017-06-22 NOTE — PROGRESS NOTES
Problem: Mobility Impaired (Adult and Pediatric)  Goal: *Acute Goals and Plan of Care (Insert Text)  Physical Therapy Goals  Initiated 6/22/2017  1. Patient will move from supine to sit and sit to supine, scoot up and down and roll side to side in bed with independence within 5 day(s). 2. Patient will transfer from bed to chair and chair to bed with modified independence using the least restrictive device within 5 day(s). 3. Patient will perform sit to stand with modified independence within 5 day(s). 4. Patient will ambulate with supervision/set-up for 150 feet with the least restrictive device within 5 day(s). 5. Patient will ascend/descend 1 stair with handrails per home setup with supervision/set-up within 5 day(s). 6. Patient will complete TUG assessment within 5 day(s). PHYSICAL THERAPY EVALUATION  Patient: Ibrahima Luther (80 y.o. female)  Date: 6/22/2017  Primary Diagnosis: AORTIC STENOSIS  Aortic stenosis  Procedure(s) (LRB):  TRANSCATHETER AORTIC VALVE REPLACEMENT, TRANSAPICAL APPROACH, 23MM S3 VALVE (Left) 1 Day Post-Op   Precautions:  Fall      ASSESSMENT :  Based on the objective data described below, the patient presents with impaired functional mobility compared to baseline 2* anxiety of PT, demanding behavior, self-limiting behavior, fear of pain from drain/chest tube site (left lateral trunk pain), adamant deference of standing exercises, and decreased cardiopulmonary tolerance. Pt received sitting OOB in chair ([place] catheter, Arterial line, NC, drain) - All vital signs stable. With maximal encouragement, pt agreed to stand from bedside chair. Pt not allowing PT to utilize gait belt to assist (gait belt donned, however), therefore, provided assistance (Mod A x 1) for buttocks clearance from chair with anterior/lateral guarding (RN and [employer] supervision as well). Pt stood x 30 seconds before requesting to sit.  Once sitting, pt instructed on heel/toe raises, LAQs, gluteal sets, and seated marches x 8; reviewed importance of continued mobility and LE exercises in order to maintain strength and regain Mod I functional mobility. PTA, pt reports living with her daughter, utilizing either a cane, walker, or wheelchair. Pt has a nursing come to her home once per week to assist with pill management. Pending pt's participation in therapy and gait assessment, disposition recommendation TBD. Patient will benefit from skilled intervention to address the above impairments. Patients rehabilitation potential is considered to be Fair  Factors which may influence rehabilitation potential include:   [ ]         None noted  [ ]         Mental ability/status  [ ]         Medical condition  [ ]         Home/family situation and support systems  [ ]         Safety awareness  [X]         Pain tolerance/management  [X]         Other: Not fond of PT        PLAN :  Recommendations and Planned Interventions:  [X]           Bed Mobility Training             [ ]    Neuromuscular Re-Education  [X]           Transfer Training                   [ ]    Orthotic/Prosthetic Training  [X]           Gait Training                         [ ]    Modalities  [X]           Therapeutic Exercises           [X]    Edema Management/Control  [X]           Therapeutic Activities            [X]    Patient and Family Training/Education  [ ]           Other (comment):     Frequency/Duration: Patient will be followed by physical therapy  5 times a week to address goals. Discharge Recommendations: To Be Determined  Further Equipment Recommendations for Discharge: Owns rolling walker, wheelchair, walker       SUBJECTIVE:   Patient stated I know what I can do, and I cannot walk today and most likely can't tomorrow.       OBJECTIVE DATA SUMMARY:   HISTORY:    Past Medical History:   Diagnosis Date    CAD (coronary artery disease)       mi    Heart failure ([facility] [address]      Hypertension      Thyroid disease       hypo     Past Surgical History:   Procedure Laterality Date    ABDOMEN SURGERY PROC UNLISTED         kidney stone    CARDIAC SURG PROCEDURE UNLIST         cabg    VASCULAR SURGERY PROCEDURE UNLIST         carotiderecctomy     Prior Level of Function/Home Situation: Supervision to Mod I functional mobility. Lives with her daughter. Reports nursing assistance (1x per week) for pill management. Personal factors and/or comorbidities impacting plan of care: Pain tolerance, HTN, CAD     Home Situation  Home Environment: Private residence  # Steps to Enter: 0  Wheelchair Ramp: No  One/Two Story Residence: One story  Living Alone: No (Lives with daughter; Home 24/7)  Support Systems: Child(magaly)  Patient Expects to be Discharged to[de-identified] Private residence  Current DME Used/Available at Home: Wheelchair, Clay Altes, rolling, Bonnita Dire, straight     EXAMINATION/PRESENTATION/DECISION MAKING:   Critical Behavior:  Neurologic State: Alert  Orientation Level: Oriented X4  Cognition: Appropriate decision making, Appropriate for age attention/concentration, Appropriate safety awareness, Follows commands  Safety/Judgement: Awareness of environment, Fall prevention, Insight into deficits  Skin:  Drain draining appropriately   Edema: None noted   Range Of Motion:  AROM: Within functional limits  Strength:    Strength: Within functional limits  Tone & Sensation:   Tone: Normal  Sensation: Intact  Coordination:  Coordination: Within functional limits      Functional Mobility:  Transfers:  Sit to Stand: Assist x1; Moderate assistance (For buttocks clearance from chair; Pt refusing assist w belt)  Stand to Sit: Assist x1;Minimum assistance (For controlled descent)  Bed to Chair: Assist x1;Minimum assistance (Per RN report)  Balance:   Sitting: Intact; Without support  Standing: Impaired; With support  Standing - Static: Fair  Standing - Dynamic :  (Pt declined standing marches/therapeutic exercises)     Functional Measure:  Timed up and go:      Timed Get Up And Go Test: (Unable to complete 2* swan catheter)      Timed Up and Go and G-code impairment scale:  Percentage of Impairment CH     0%    CI     1-19% CJ     20-39% CK     40-59% CL     60-79% CM     80-99% CN      100%   Timed   Score 0-56 10 11-12 13-14 15-16 17-18 19 20          < than 10 seconds=Normal  Greater then 13.5 seconds (in elderly)=Increased fall risk   Darline MENDOZA, Almaz Wynne. Predicting the probability for falls in community dwelling older adults using the Timed Up and Go Test. Phys Ther. 2000;80:896-903. G codes: In compliance with CMSs Claims Based Outcome Reporting, the following G-code set was chosen for this patient based on their primary functional limitation being treated: The outcome measure chosen to determine the severity of the functional limitation was the TUG with a score of unable to complete this date which was correlated with the impairment scale.       · Mobility - Walking and Moving Around:               - CURRENT STATUS:    CN - 100% impaired, limited or restricted               - GOAL STATUS:           CK - 40%-59% impaired, limited or restricted               - D/C STATUS:                       ---------------To be determined---------------      Physical Therapy Evaluation Charge Determination   History Examination Presentation Decision-Making   HIGH Complexity :3+ comorbidities / personal factors will impact the outcome/ POC  MEDIUM Complexity : 3 Standardized tests and measures addressing body structure, function, activity limitation and / or participation in recreation  LOW Complexity : Stable, uncomplicated  Other outcome measures TUG  HIGH       Based on the above components, the patient evaluation is determined to be of the following complexity level: LOW      Pain:  Pain Scale 1: Numeric (0 - 10)  Pain Intensity 1: 0  Pain Location 1: Abdomen  Pain Orientation 1: Left  Pain Description 1: Aching  Pain Intervention(s) 1: Repositioned Activity Tolerance:   Please refer to the flowsheet for vital signs taken during this treatment. After treatment:   [X]         Patient left in no apparent distress sitting up in chair  [ ]         Patient left in no apparent distress in bed  [X]         Call bell left within reach  [X]         Nursing notified  [ ]         Caregiver present  [ ]         Bed alarm activated      COMMUNICATION/EDUCATION:   The patients plan of care was discussed with: Registered Nurse and Rehabilitation Attendant. [X]         Fall prevention education was provided and the patient/caregiver indicated understanding. [X]         Patient/family have participated as able in goal setting and plan of care. [X]         Patient/family agree to work toward stated goals and plan of care. [ ]         Patient understands intent and goals of therapy, but is neutral about his/her participation. [ ]         Patient is unable to participate in goal setting and plan of care.      Thank you for this referral.  Stefanie Connell PT, DPT   Time Calculation: 13 mins

## 2017-06-22 NOTE — PROGRESS NOTES
Spiritual Care Assessment/Progress Notes    Jessica Monzon [id number]  [id number]    12/1/1932  80 y.o.  female    Patient Telephone Number: [phone] (home)   Scientologist Affiliation: Zoroastrian   Language: English   Extended Emergency Contact Information  Primary Emergency Contact: [address] Phone: [phone]  Relation: Daughter   Patient Active Problem List    Diagnosis Date Noted    S/P TAVR (transcatheter aortic valve replacement) 06/22/2017    Aortic stenosis 06/21/2017    Unstable angina ([address] 04/08/2012    ASHD (arteriosclerotic heart disease) 04/08/2012    Hypertension, benign 04/08/2012    ASO (arteriosclerosis obliterans) 04/08/2012    Hypercholesteremia 04/08/2012    Hypothyroidism 04/08/2012    Nonrheumatic aortic valve stenosis 04/08/2012    Acute MI, subendocardial, initial episode of care [facility]) 08/20/2011        Date: 6/22/2017       Level of Scientologist/Spiritual Activity:  []         Involved in neo tradition/spiritual practice    []         Not involved in neo tradition/spiritual practice  []         Spiritually oriented    []         Claims no spiritual orientation    []         seeking spiritual identity  []         Feels alienated from Lutheran practice/tradition  []         Feels angry about Lutheran practice/tradition  [x]         Spirituality/Lutheran tradition is a resource for coping at this time.   []         Not able to assess due to medical condition    Services Provided Today:  []         crisis intervention    []         reading Scriptures  [x]         spiritual assessment    []         prayer  []         empathic listening/emotional support  []         rites and rituals (cite in comments)  []         life review     []         Lutheran support  []         theological development   []         advocacy  []         ethical dialog     []         blessing  []         bereavement support    []         support to family  [] anticipatory grief support   []         help with AMD  []         spiritual guidance    []         meditation      Spiritual Care Needs  []         Emotional Support  []         Spiritual/Pentecostalism Care  []         Loss/Adjustment  []         Advocacy/Referral                /Ethics  [x]         No needs expressed at               this time  []         Other: (note in               comments)  [address]  []         Follow up visits with               pt/family  []         Provide materials  []         Schedule sacraments  []         Contact Community               Clergy  [x]         Follow up as needed  []         Other: (note in               comments)       Initial spiritual assessment with a very pleasant patient in CVICU 4352. Welcomed  visit--however had no immediate needs. Reported that she was more concerned about her daughter than herself. Desires a follow up visit with daughter present. Instructed her to notify her nurse when her daughter becomes available in order to have a  paged. Offered assurance of prayer. [facility]'s Staff  (Nic Montemayor Patient Care Specialist)   Paging Service [facility]([phone])

## 2017-06-22 NOTE — PROGRESS NOTES
Problem: Cardiac Valve Surgery: Post-Op Day 1  Goal: Activity/Safety  Outcome: Progressing Towards Goal  Up with assistance, call bell within reach  Goal: Diagnostic Test/Procedures  Outcome: Progressing Towards Goal  Maintains CT  Goal: Nutrition/Diet  Outcome: Progressing Towards Goal  Aha diet  Goal: Medications  Outcome: Progressing Towards Goal  Remains on iv abx and will start coumadin tonight  Goal: Respiratory  Outcome: Progressing Towards Goal  Weaned to 3L NC O2  Goal: Psychosocial  Outcome: Progressing Towards Goal  Good affect, daughter at bedside  Goal: *Hemodynamically stable without vasoactive medications  Outcome: Progressing Towards Goal  bp slightly elevated will follow  Goal: *Stable cardiac rhythm  Outcome: Progressing Towards Goal  Chronic afib, rate controlled    Problem: Pressure Injury - Risk of  Goal: *Prevention of pressure ulcer  Outcome: Progressing Towards Goal    06/22/17 1302   Wound Prevention and Protection Methods   Orientation of Wound Prevention Posterior   Location of Wound Prevention Sacrum/Coccyx   Dressing Present  No   Read Only, Retired: Wound Treatment (non-mechanical)   Wound Offloading (Prevention Methods) Bed, pressure reduction mattress;Repositioning   Remains free of pressure ulcers    Problem: Falls - Risk of  Goal: *Absence of falls  Outcome: Progressing Towards Goal  Remains free of falls, non slip footwear in place

## 2017-06-22 NOTE — PROGRESS NOTES
Problem: Self Care Deficits Care Plan (Adult)  Goal: *Acute Goals and Plan of Care (Insert Text)  Occupational Therapy Goals  Initiated 6/22/2017  1. Patient will perform ADLs standing 5 mins without fatigue or LOB with modified independence within 7 day(s). 2. Patient will perform lower body ADLs with modified independence within 7 day(s). 3. Patient will perform bathing with modified independence within 7 day(s). 4. Patient will perform toilet transfers with modified independence within 7 day(s). 5. Patient will perform all aspects of toileting with modified independence within 7 day(s). 6. Patient will participate in cardiac/sternal upper extremity therapeutic exercise/activities to increase independence with ADLs with modified independence for 5 minutes within 7 day(s). OCCUPATIONAL THERAPY EVALUATION  Patient: Ibrahima Luther (80 y.o. female)  Date: 6/22/2017  Primary Diagnosis: AORTIC STENOSIS  Aortic stenosis  Procedure(s) (LRB):  TRANSCATHETER AORTIC VALVE REPLACEMENT, TRANSAPICAL APPROACH, 23MM S3 VALVE (Left) 1 Day Post-Op   Precautions:   Fall      ASSESSMENT :  Based on the objective data described below, the patient presents with setup to total A upper body ADLs, total A lower body ADLs, and in bed evaluation. Patient doing great with nursing: OOB to chair, functional mobility to and from bathroom. ADLs limited by active ROM, strength, endurance, cardiopulmonary tolerance (4L NC), pain management and anxiety. Patient desiring to discharge home with daughter A PRN. Recommend with nursing patient to complete as able in order to maintain strength, endurance and independence: ADLs with supervision/setup, OOB to chair 3x/day and mobilizing to the bathroom for toileting with 1 assist. Thank you for your assistance. Patient will benefit from skilled intervention to address the above impairments.   Patients rehabilitation potential is considered to be Good  Factors which may influence rehabilitation potential include:   [X]             None noted  [ ]             Mental ability/status  [ ]             Medical condition  [ ]             Home/family situation and support systems  [ ]             Safety awareness  [ ]             Pain tolerance/management  [ ]             Other:        PLAN :  Recommendations and Planned Interventions:  [X]               Self Care Training                  [X]        Therapeutic Activities  [X]               Functional Mobility Training    [ ]        Cognitive Retraining  [X]               Therapeutic Exercises           [X]        Endurance Activities  [X]               Balance Training                   [ ]        Neuromuscular Re-Education  [ ]               Visual/Perceptual Training     [X]   Home Safety Training  [X]               Patient Education                 [X]        Family Training/Education  [ ]               Other (comment):     Frequency/Duration: Patient will be followed by occupational therapy 5 times a week to address goals. 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       OBJECTIVE DATA SUMMARY:   HISTORY:   Past Medical History:   Diagnosis Date    CAD (coronary artery disease)       mi    Heart failure ([facility]      Hypertension      Thyroid disease       hypo     Past Surgical History:   Procedure Laterality Date    ABDOMEN SURGERY PROC UNLISTED         kidney stone    CARDIAC SURG PROCEDURE UNLIST         cabg    VASCULAR SURGERY PROCEDURE UNLIST         carotiderecctomy        Prior Level of Function/Home Situation: modified independence increased time ADLs, sitting to dress and prop LEs on stool, standing to shower, recently unable to go on as many errands and outings, walking dog or looking at her flowers. Daughter independent, completes all instrumental ADLs.  Nurse completes medication management. All meds sent to one pharmacist for overview. Expanded or extensive additional review of patient history:      Home Situation  Home Environment: Private residence  # Steps to Enter: 0  Wheelchair Ramp: No  One/Two Story Residence: One story  Living Alone: No (Lives with daughter; Home 24/7)  Support Systems: Child(magaly)  Patient Expects to be Discharged to[de-identified] Private residence  Current DME Used/Available at Home: Wheelchair, Zhou Yordy, rolling, Francie Cousin, straight, Grab bars, Raised toilet seat  Tub or Shower Type: Shower  [X]  Right hand dominant             [ ]  Left hand dominant     EXAMINATION OF PERFORMANCE DEFICITS:  Cognitive/Behavioral Status:  Neurologic State: Alert  Orientation Level: Oriented X4  Cognition: Appropriate for age attention/concentration; Appropriate safety awareness; Appropriate decision making  Perception: Appears intact  Perseveration: No perseveration noted  Safety/Judgement: Awareness of environment     Skin: intact R chest tube and bandage     Edema: intact as seen     Hearing: Auditory  Auditory Impairment: Hard of hearing, right side     Vision/Perceptual:                           Acuity: Impaired near vision; Impaired far vision    Corrective Lenses: Glasses     Range of Motion:  Shoulder flexion 90*, elbows-digits WDL  AROM: Generally decreased, functional                          Strength:  -3/5 shoulders during ADLs,cardiac sx and pain limiting formal testing; elbows-digits +3/5 during ADLs  Strength: Generally decreased, functional                 Coordination:  Coordination: Within functional limits  Fine Motor Skills-Upper: Right Intact; Left Intact    Gross Motor Skills-Upper: Left Intact; Right Intact     Tone & Sensation:     Tone: Normal  Sensation: Intact                       Balance:  Sitting: Intact; Without support  Standing: Impaired; With support  Standing - Static: Fair  Standing - Dynamic :  (Pt declined standing marches/therapeutic exercises)     Functional Mobility and Transfers for ADLs:  Bed Mobility:        Transfers:  Sit to Stand: Assist x1; Moderate assistance (For buttocks clearance from chair; Pt refusing assist w belt)  Stand to Sit: Assist x1;Minimum assistance (For controlled descent)  Bed to Chair: Assist x1;Minimum assistance (Per RN report)     ADL Assessment:  Feeding: Supervision setup foil containers only     Oral Facial Hygiene/Grooming: Supervision setup on beside tray     Bathing: Total assistance     Upper Body Dressing: Total assistance     Lower Body Dressing: Total assistance     Toileting: Total assistance                 ADL Intervention and task modifications:          Patient instructed and indicated understanding the benefits of maintaining activity tolerance, functional mobility, and independence with self care tasks during acute stay  to ensure safe return home and to baseline. Encouraged patient to increase frequency and duration OOB, be out of bed for all meals, perform daily ADLs (as approved by RN/MD regarding bathing etc), and performing functional mobility to/from bathroom. May have to adjust home setup to increase ease with items closer to waist height, don clothing tailor sitting and don all clothing while sitting prior to standing. Increase activity tolerance for home by pacing self with increasing the arm exercises, sitting duration, frequency OOB, walking, standing, and ADLs. Instructed and indicated understanding of s/s of too much activity, how to respond to s/s safely. Cognitive Retraining  Safety/Judgement: Awareness of environment     Therapeutic Exercise:   encouraged to complete daily.    Pain:  Pain Scale 1: Numeric (0 - 10)  Pain Intensity 1: 5  Pain Location 1: Chest  Pain Orientation 1: Anterior  Pain Description 1: Aching  Pain Intervention(s) 1: Medication (see MAR)  Activity Tolerance:   3L NC  Please refer to the flowsheet for vital signs taken during this treatment. After treatment:   [ ] Patient left in no apparent distress sitting up in chair  [X] Patient left in no apparent distress in bed  [X] Call bell left within reach  [X] Nursing notified  [ ] Caregiver present  [ ] Bed alarm activated      COMMUNICATION/EDUCATION:   The patients plan of care was discussed with: Registered Nurse.  [X] Home safety education was provided and the patient/caregiver indicated understanding. [X] Patient/family have participated as able in goal setting and plan of care. [X] Patient/family agree to work toward stated goals and plan of care. [ ] Patient understands intent and goals of therapy, but is neutral about his/her participation. [ ] Patient is unable to participate in goal setting and plan of care. This patients plan of care is appropriate for delegation to [facility].      Thank you for this referral.  Bassem Chaevz  Time Calculation: 15 mins

## 2017-06-22 NOTE — PROGRESS NOTES
[facility] ICU Progress Note    Admit Date: 2017  POD:  1 Day Post-Op    Procedure:  Procedure(s):  TRANSCATHETER AORTIC VALVE REPLACEMENT, TRANSAPICAL APPROACH, 23MM S3 VALVE        Subjective:   Pt seen with Dr. Mario Alberto Angulo. Afebrile, on 4 L NC. On cardene 2. Objective:   Vitals:  Blood pressure 106/45, pulse 95, temperature 98.7 °F (37.1 °C), resp. rate 19, height 5' 6\" (1.676 m), weight 160 lb 0.9 oz (72.6 kg), SpO2 97 %. Temp (24hrs), Av.3 °F (36.8 °C), Min:97.6 °F (36.4 °C), Max:98.7 °F (37.1 °C)    Hemodynamics:   CO: CO (l/min): 4.5 l/min   CI: CI (l/min/m2): 2.1 l/min/m2   CVP: CVP (mmHg): 12 mmHg (17 1000)   SVR: SVR (dyne*sec)/cm5: 1045 (dyne*sec)/cm5 (17 5062)   PAP Systolic: PAP Systolic: 52 (82/18/62 4874)   PAP Diastolic: PAP Diastolic: 26 (83/18/08 6082)   PVR:     SV02: SVO2 (%): 57 % (17 1000)   SCV02:      EKG/Rhythm:  A fib     Extubation Date / Time:  at  1220    CT Output: 250 ml     Ventilator:  Ventilator Volumes  Vt Set (ml): 500 ml (17 1058)  Vt Exhaled (Machine Breath) (ml): 518 ml (17 1204)  Ve Observed (l/min): 9.11 l/min (17 1204)    Oxygen Therapy:  Oxygen Therapy  O2 Sat (%): 97 % (17 1000)  Pulse via Oximetry: 102 beats per minute (17 1000)  O2 Device: Nasal cannula (17 0400)  O2 Flow Rate (L/min): 4 l/min (17 0400)  FIO2 (%): 50 % (17 1204)    CXR:     Interval extubation.     Regression of mild interstitial edema.     Slight subsegmental atelectasis left base. Admission Weight: Last Weight   Weight: 168 lb 10.4 oz (76.5 kg) Weight: 160 lb 0.9 oz (72.6 kg)     Intake / Output / Drain:  Current Shift:    Last 24 hrs.:     Intake/Output Summary (Last 24 hours) at 17 1028  Last data filed at 17 0600   Gross per 24 hour   Intake           774.76 ml   Output             1470 ml   Net          -695.24 ml       EXAM:  General:  Pleasant, in no obvious distress. Lungs:   Clear to auscultation upper, diminished in bases. Incision:  Drs C/D/I. Heart:  Irregular rate and rhythm, S1, S2 normal, no murmur, click, rub or gallop. Abdomen:   Soft, non-tender. Bowel sounds hypoactive. No masses,  No organomegaly. Extremities:  No edema. PPP. Neurologic:  Gross motor and sensory apparatus intact. Labs:   Recent Labs      17   0257  17   1105  17   1101   WBC  11.3*   --   6.7   HGB  9.9*   --   9.8*   HCT  31.0*   --   30.4*   PLT  95*   --   99*   NA  142   --   144   K  4.4   --   3.3*   BUN  25*   --   24*   CREA  0.87   --   0.82   GLU  140*   --   144*   GLUCPOC   --   147*   --    INR   --    --   1.2*        Assessment:     Active Problems:    Nonrheumatic aortic valve stenosis (2012)      Aortic stenosis (2017)      S/P TAVR (transcatheter aortic valve replacement) (2017)         Plan/Recommendations/Medical Decision Makin. S/p Transapical TAVR: Stable. On asa. No Plavix (pt already on coumadin for a fib). 2. Atelectasis:  Wean oxygen for 02 sat > 90%. Increase IS and activity as tolerated. 3. Hx of Chronic Afib:  Resume coumadin tonight. Trend INR. Resume diltiazem. 4. Hx of CVA:  On asa, resume statin. 5. S/p CABG:  On asa, statin. Resume BB when appropriate. 6. Hx of Frequent UTIs: On ancef. 7. Hx of hypothyroid:  On synthroid. 8. HTN: Wean cardene for SPB < 140. resume dilt. Resume other meds as BP allows. Monitor. 9. Dispo: PT/OT eval.  Transfer to [facility]. D/c [place].      Signed By: Ismael Guadarrama NP

## 2017-06-22 NOTE — CARDIO/PULMONARY
Cardiac Wellness: Valve surgery education folder at bedside. Met with Teressa Arnold to review cardiac surgery post discharge instructions and to discuss participation in [address] [place] [address].    Educated using teach back method. Reviewed use of bear for sternal support, daily weights and temperatures, showering restrictions, signs and symptoms of infection at surgery sites, daily walking and arm exercises, and use of incentive spirometer. Teressa Arnold was able to demonstrate proper use of incentive spirometer, achieving 1000 ml. Smoking history assessed. Patient is a former smoker. Encouraged heart healthy, low sodium diet. Discussed red reminder bracelet and reviewed when to call surgeons office. Discussed Cardiac Wellness Program format and encouraged participation. General questions answered. Teressa Arnold verbalized understanding.      Will continue to follow for educational needs and enrollment in the Cardiac Wellness Program.

## 2017-06-22 NOTE — PROGRESS NOTES
0800: Bedside shift change report given to Elvin Siemens (oncoming nurse) by Cabrera Montiel (offgoing nurse). Report included the following information SBAR, Kardex, ED Summary, OR Summary, Procedure Summary, Intake/Output, MAR, Recent Results and Cardiac Rhythm A Fib. Care of pt assumed. 0900: Justo De La Cruz, NP and Dr. Heide Freeman at bedside. Stated to keep SBP<150. Once cardene weaned we can pull lines. 930: Cardene off. BP stable. 1000: PT at bedside with pt. Able to stand but in a lot of pain at chest tube site. Once PT team gone, pt no longer in pain. Discussed mental block with moving and walking with PT.  1045: [place], double lumen, L radial art line and barney removed. Transfer orders placed for pt.    1115:Unable to place 2nd IV. PICC team called and estimated 30mins before they could be at bedside. 1215: PICC team at bedside. Unable to start second IV after 2 attempts. Justo De La Cruz, NP okay with pt going to CVSU with 1 IV. 1230: Report given to Willow Fernandez RN on CVSU.  1245: TRANSFER - OUT REPORT:    [facility]  [facility] for routine progression of care     Report consisted of patients Situation, Background, Assessment and   Recommendations(SBAR). Information from the following report(s) SBAR, Kardex, OR Summary, Procedure Summary, Intake/Output, MAR, Recent Results and Cardiac Rhythm A Fib was reviewed with the receiving nurse. Lines:   Peripheral IV 06/21/17 Right Hand (Active)   Site Assessment Clean, dry, & intact 6/22/2017  1:03 PM   Phlebitis Assessment 0 6/22/2017  1:03 PM   Infiltration Assessment 0 6/22/2017  1:03 PM   Dressing Status Clean, dry, & intact 6/22/2017  1:03 PM   Dressing Type Tape;Transparent 6/22/2017  1:03 PM   Hub Color/Line Status Blue; Infusing 6/22/2017  1:03 PM   Action Taken Open ports on tubing capped 6/22/2017  1:03 PM   Alcohol Cap Used Yes 6/22/2017  1:03 PM     Opportunity for questions and clarification was provided.     Patient transported with:   Monitor  O2 @ 4 liters  Registered Nurse  [facility]

## 2017-06-22 NOTE — ANESTHESIA POSTPROCEDURE EVALUATION
Post-Anesthesia Evaluation and Assessment    Patient: Sarah Jennings MRN: [id number]  SSN: [id number]    YOB: 1932  Age: 80 y.o. Sex: female       Cardiovascular Function/Vital Signs  Visit Vitals    /45    Pulse 95    Temp 37.1 °C (98.7 °F)    Resp 19    Ht 5' 6\" (1.676 m)    Wt 72.6 kg (160 lb 0.9 oz)    SpO2 97%    BMI 25.83 kg/m2       Patient is status post general anesthesia for Procedure(s):  TRANSCATHETER AORTIC VALVE REPLACEMENT, TRANSAPICAL APPROACH, 23MM S3 VALVE. Nausea/Vomiting: None    Postoperative hydration reviewed and adequate. Pain:  Pain Scale 1: Numeric (0 - 10) (06/22/17 0900)  Pain Intensity 1: 0 (06/22/17 0900)   Managed    Neurological Status:   Neuro (WDL): Within Defined Limits (06/21/17 0739)  Neuro  Neurologic State: Alert (06/22/17 1200)  Orientation Level: Oriented X4 (06/22/17 1200)  Cognition: Appropriate decision making; Appropriate for age attention/concentration; Appropriate safety awareness; Follows commands (06/22/17 1200)  Speech: Clear (06/22/17 0800)  Assessment L Pupil: Brisk;Round (06/22/17 0800)  Size L Pupil (mm): 3 (06/21/17 1500)  Assessment R Pupil: Brisk;Round (06/22/17 0800)  Size R Pupil (mm): 3 (06/21/17 1500)  LUE Motor Response: Purposeful (06/22/17 0800)  LLE Motor Response: Purposeful (06/22/17 0800)  RUE Motor Response: Purposeful (06/22/17 0800)  RLE Motor Response: Purposeful (06/22/17 0800)   At baseline    Mental Status and Level of Consciousness: Arousable    Pulmonary Status:   O2 Device: Nasal cannula (06/22/17 0800)   Adequate oxygenation and airway patent    Complications related to anesthesia: None    Post-anesthesia assessment completed.  No concerns    Signed By: Sheryl Bejarano MD     June 22, 2017

## 2017-06-22 NOTE — PROGRESS NOTES
Physical Therapy  6.22.17    0908  Orders received, acknowledged, and appreciated. Will follow-up for formal PT evaluation following interdisciplinary rounds. Thank you!   Chata Vazquez, PT, DPT

## 2017-06-23 ENCOUNTER — APPOINTMENT (OUTPATIENT)
Dept: GENERAL RADIOLOGY | Age: 82
DRG: 267 | End: 2017-06-23
Attending: NURSE PRACTITIONER
Payer: MEDICARE

## 2017-06-23 LAB
ANION GAP BLD CALC-SCNC: 5 MMOL/L (ref 5–15)
BUN SERPL-MCNC: 24 MG/DL (ref 6–20)
BUN/CREAT SERPL: 28 (ref 12–20)
CALCIUM SERPL-MCNC: 8.5 MG/DL (ref 8.5–10.1)
CHLORIDE SERPL-SCNC: 110 MMOL/L (ref 97–108)
CO2 SERPL-SCNC: 25 MMOL/L (ref 21–32)
CREAT SERPL-MCNC: 0.85 MG/DL (ref 0.55–1.02)
ERYTHROCYTE [DISTWIDTH] IN BLOOD BY AUTOMATED COUNT: 19.2 % (ref 11.5–14.5)
GLUCOSE SERPL-MCNC: 116 MG/DL (ref 65–100)
HCT VFR BLD AUTO: 31.5 % (ref 35–47)
HGB BLD-MCNC: 9.9 G/DL (ref 11.5–16)
INR PPP: 1.4 (ref 0.9–1.1)
MAGNESIUM SERPL-MCNC: 2.2 MG/DL (ref 1.6–2.4)
MCH RBC QN AUTO: 26.3 PG (ref 26–34)
MCHC RBC AUTO-ENTMCNC: 31.4 G/DL (ref 30–36.5)
MCV RBC AUTO: 83.6 FL (ref 80–99)
PLATELET # BLD AUTO: 85 K/UL (ref 150–400)
POTASSIUM SERPL-SCNC: 4 MMOL/L (ref 3.5–5.1)
PROTHROMBIN TIME: 13.9 SEC (ref 9–11.1)
RBC # BLD AUTO: 3.77 M/UL (ref 3.8–5.2)
SODIUM SERPL-SCNC: 140 MMOL/L (ref 136–145)
WBC # BLD AUTO: 7.5 K/UL (ref 3.6–11)

## 2017-06-23 PROCEDURE — 83735 ASSAY OF MAGNESIUM: CPT | Performed by: NURSE PRACTITIONER

## 2017-06-23 PROCEDURE — 74011250637 HC RX REV CODE- 250/637: Performed by: NURSE PRACTITIONER

## 2017-06-23 PROCEDURE — 80048 BASIC METABOLIC PNL TOTAL CA: CPT | Performed by: NURSE PRACTITIONER

## 2017-06-23 PROCEDURE — 85610 PROTHROMBIN TIME: CPT | Performed by: NURSE PRACTITIONER

## 2017-06-23 PROCEDURE — 65660000000 HC RM CCU STEPDOWN

## 2017-06-23 PROCEDURE — 74011250636 HC RX REV CODE- 250/636: Performed by: NURSE PRACTITIONER

## 2017-06-23 PROCEDURE — 71010 XR CHEST PORT: CPT

## 2017-06-23 PROCEDURE — 77010033678 HC OXYGEN DAILY

## 2017-06-23 PROCEDURE — 97116 GAIT TRAINING THERAPY: CPT

## 2017-06-23 PROCEDURE — 74011250637 HC RX REV CODE- 250/637: Performed by: THORACIC SURGERY (CARDIOTHORACIC VASCULAR SURGERY)

## 2017-06-23 PROCEDURE — 36415 COLL VENOUS BLD VENIPUNCTURE: CPT | Performed by: NURSE PRACTITIONER

## 2017-06-23 PROCEDURE — 93005 ELECTROCARDIOGRAM TRACING: CPT

## 2017-06-23 PROCEDURE — 85027 COMPLETE CBC AUTOMATED: CPT | Performed by: NURSE PRACTITIONER

## 2017-06-23 RX ORDER — FUROSEMIDE 20 MG/1
20 TABLET ORAL DAILY
Status: DISCONTINUED | OUTPATIENT
Start: 2017-06-24 | End: 2017-06-26 | Stop reason: HOSPADM

## 2017-06-23 RX ORDER — WARFARIN SODIUM 5 MG/1
5 TABLET ORAL ONCE
Status: COMPLETED | OUTPATIENT
Start: 2017-06-23 | End: 2017-06-23

## 2017-06-23 RX ADMIN — SERTRALINE HYDROCHLORIDE 50 MG: 50 TABLET ORAL at 08:42

## 2017-06-23 RX ADMIN — Medication 10 ML: at 21:59

## 2017-06-23 RX ADMIN — TRAMADOL HYDROCHLORIDE 100 MG: 50 TABLET, FILM COATED ORAL at 03:18

## 2017-06-23 RX ADMIN — FAMOTIDINE 20 MG: 20 TABLET ORAL at 08:42

## 2017-06-23 RX ADMIN — CEFAZOLIN 2 G: 1 INJECTION, POWDER, FOR SOLUTION INTRAMUSCULAR; INTRAVENOUS; PARENTERAL at 06:33

## 2017-06-23 RX ADMIN — DILTIAZEM HYDROCHLORIDE 180 MG: 180 CAPSULE, EXTENDED RELEASE ORAL at 21:59

## 2017-06-23 RX ADMIN — ROSUVASTATIN CALCIUM 40 MG: 40 TABLET, FILM COATED ORAL at 21:59

## 2017-06-23 RX ADMIN — DOCUSATE SODIUM AND SENNOSIDES 1 TABLET: 8.6; 5 TABLET, FILM COATED ORAL at 08:42

## 2017-06-23 RX ADMIN — ASPIRIN 81 MG 81 MG: 81 TABLET ORAL at 08:42

## 2017-06-23 RX ADMIN — WARFARIN SODIUM 5 MG: 5 TABLET ORAL at 17:56

## 2017-06-23 RX ADMIN — LEVOTHYROXINE SODIUM 125 MCG: 125 TABLET ORAL at 06:33

## 2017-06-23 RX ADMIN — CHLORHEXIDINE GLUCONATE 10 ML: 1.2 RINSE ORAL at 00:00

## 2017-06-23 RX ADMIN — Medication 400 MG: at 21:59

## 2017-06-23 RX ADMIN — Medication 400 MG: at 08:42

## 2017-06-23 RX ADMIN — CHLORHEXIDINE GLUCONATE 10 ML: 1.2 RINSE ORAL at 08:43

## 2017-06-23 RX ADMIN — Medication 10 ML: at 06:33

## 2017-06-23 RX ADMIN — Medication 10 ML: at 17:57

## 2017-06-23 RX ADMIN — DOCUSATE SODIUM AND SENNOSIDES 1 TABLET: 8.6; 5 TABLET, FILM COATED ORAL at 17:56

## 2017-06-23 RX ADMIN — FAMOTIDINE 20 MG: 20 TABLET ORAL at 21:59

## 2017-06-23 RX ADMIN — CHLORHEXIDINE GLUCONATE 10 ML: 1.2 RINSE ORAL at 22:00

## 2017-06-23 NOTE — PROGRESS NOTES
CM noted new consult for \"IP Rehab. \" Patient presently with plan for discharge home with daughter and [facility] through [facility] (as documented by CALVIN Mcclendon 6/22/17). Per most recent PT/OT notes, PT recommending rehab vs HH with 24/7 assistance and OT recommending HH. CM visited patient bedside. In re: placement, patient wishes to discuss options with her daughters before making any decisions. She states that, if she agrees to rehab, she prefers placement in [place] and has previously been to [facility]. CM sent allscripts referral to [address] [place] [address], also called intake number listed [phone] however number goes straight to voicemail. CM with concerns about patient's appropriateness for IP rehab, as she may be too high-level and better suited for SNF if not discharging home. Will ask weekend CM to follow-up with patient and CVSU team to determine final disposition.  Romy Spencer, MSW/CRM

## 2017-06-23 NOTE — PROGRESS NOTES
Chart reviewed. Instruction to rehab tech, Aguila Lindsay, as to patient plan of care and goals. Tech instruction to patient, patient demonstrated 10 reps 1 set v. Gravity sternal exercises  with supervision. This activity's purpose is to increase strength and endurance to increase independence.     Total time 8 mins

## 2017-06-23 NOTE — CARDIO/PULMONARY
Cardiac Wellness: Transcatheter surgery education folder at bedside. Met with Jazmine to reinforce cardiac surgery post discharge instructions and to discuss participation in [address] [place] [address].     Educated using teach back method. Reviewed signs and symptoms of infection at surgery sites and use of incentive spirometer. Encouraged heart healthy, low sodium diet. Discussed red reminder bracelet and reviewed when to call surgeons office. Discussed cardiac rehab for [place] is on her AVS to call after discharge. She is hoping for discharge this weekend.

## 2017-06-23 NOTE — PROGRESS NOTES
1930: Bedside shift change report given to Lizbeth Herrera (oncoming nurse) by Graeme Strauss (offgoing nurse). Report included the following information SBAR, Kardex, OR Summary and Recent Results. 0630: Pt refusing bath- wishes to wait until after breakfast.    0730: Bedside and Verbal shift change report given to Graeme Strauss (oncoming nurse) by Lizbeth Herrera (offgoing nurse). Report included the following information SBAR, Kardex, OR Summary, MAR and Recent Results.

## 2017-06-23 NOTE — DISCHARGE INSTRUCTIONS
Cardiac Surgery Specialist    [address] [place] [address] [address]                                          Lin Simmons                                        [address], [address] [facility]  Office- [phone]  Fax- [phone]       Office- [phone]  Fax- [phone]  _____________________________________________________________  Dr. Sussy Lui PA-C    Name:Sinai Marquez     Surgery & Date: Procedure(s):  TRANSCATHETER AORTIC VALVE REPLACEMENT, TRANSAPICAL APPROACH, 23MM S3 VALVE    Discharge Date:  6/26/17    MEDICATIONS:  Please refer to your After Visit Summary for your medication list.   DO NOT TAKE ANY MEDICATIONS THAT ARE NOT ON THIS LIST    INSTRUCTIONS:  NO SMOKING OR TOBACCO PRODUCTS  Do not follow the activity/exercise instructions in your discharge book given to you as an inpatient. You have no activity restrictions. You may shower. Wash all incisions twice daily with mild soap and water. No lotions, ointments or powder. Call the office immediately for any redness, swelling, or drainage from your incision. Take your temperature daily and call for a temperature of 101 degrees or higher or for any symptoms that make you think you have and infection. Weigh yourself each morning. Call if you gain more than 5 pounds in 48 hours. Use the incentive spirometer 6-8 times a day-10 breaths each time. Walk several hundred feet several times daily. DIET  Eat an American Heart Association diet. If you are having trouble with your appetite, eat what you can. Try eating small, frequent meals throughout the day. ACTIVITY  1. You have no activity restrictions. You may resume your daily activities at home, based on your comfort level. You may also drive. FOLLOW UP  1. Your first follow up appointment will be on 6/28/17 at 10:00 am. Our office is located in [address] [place] [address] on floor G-5. Your second follow up appointment will be in four weeks, on 7/24/17 at 1:00 pm. You will need to have an ECHO prior to your appointment time. Our office will set that up for you. Please call our office at [phone] if you are unable to make either one of these appointments. 2. You will be receiving a call before your 3 day follow up appointment to begin cardiac rehab. They are located in the [address] [place] [address] on [facility]. Their phone number is [phone]. Please call if you have not been contacted 2-3 weeks after discharge from the hospital.6/28  3. We will make an appointment for you with your cardiologist in 4-5 weeks. 4. Consult you primary care physician regarding your influenza &   pneumovax vaccines. 5.   Please bring all medications with you to your appointment.     Signature:___________________________________________________

## 2017-06-23 NOTE — PROGRESS NOTES
[facility] FLOOR Progress Note    Admit Date: 2017  POD: 2 Days Post-Op      Procedure:  Procedure(s):  TRANSCATHETER AORTIC VALVE REPLACEMENT, TRANSAPICAL APPROACH, 23MM S3 VALVE    Subjective:   Pt seen with Dr. Meche Roche, pt c/o pain from CT but better after removal.     Objective:     Visit Vitals    /58 (BP 1 Location: Right arm, BP Patient Position: Sitting)    Pulse 97    Temp 97.6 °F (36.4 °C)    Resp 18    Ht 5' 6\" (1.676 m)    Wt 173 lb 1 oz (78.5 kg)    SpO2 100%    Breastfeeding No    BMI 27.93 kg/m2     Temp (24hrs), Av.3 °F (36.8 °C), Min:97.6 °F (36.4 °C), Max:98.8 °F (37.1 °C)      Last 24hr Input/Output:    Intake/Output Summary (Last 24 hours) at 17 1258  Last data filed at 17 1017   Gross per 24 hour   Intake              960 ml   Output             1130 ml   Net             -170 ml        EKG/Rhythm: A-fib    Oxygen: NC 2L    CXR: There is stable mild diffuse interstitial opacities. Left basilar opacity likely  representing a combination of atelectasis and small pleural effusion is stable. The right pleural space is clear. There is no pneumothorax. The bones and upper  abdomen are stable. Admission Weight: Last Weight   Weight: 168 lb 10.4 oz (76.5 kg) Weight: 173 lb 1 oz (78.5 kg)       EXAM:  General:    Lungs:   Clear to auscultation bilaterally. Incision:  No erythema, drainage or swelling. Heart:  Regular rate and rhythm, S1, S2 normal, no murmur, click, rub or gallop. Abdomen:   Soft, non-tender. Bowel sounds normal. No masses,  No organomegaly. Extremities:  No edema. PPP   Neurologic:  Gross motor and sensory apparatus intact.      Activity: ad yaquelin    Diet:  Heart healthy    Lab Data Reviewed: Recent Labs      17   0414   17   1105   WBC  7.5   < >   --    HGB  9.9*   < >   --    HCT  31.5*   < >   --    PLT  85*   < >   --    NA  140   < >   --    K  4.0   < >   --    BUN  24*   < >   --    CREA  0.85   < >   --    GLU  116*   < >   -- GLUCPOC   --    --   147*   INR  1.4*   --    --     < > = values in this interval not displayed. Assessment:     Active Problems:    Nonrheumatic aortic valve stenosis (2012)      Aortic stenosis (2017)      S/P TAVR (transcatheter aortic valve replacement) (2017)         Plan/Recommendations/Medical Decision Makin. S/p Transapical TAVR: Stable. On asa. No Plavix (pt already on coumadin for a fib). 2. Atelectasis:  Wean oxygen for 02 sat > 90%. Increase IS and activity as tolerated. Resume lasix today  3. Hx of Chronic Afib: Cont coumadin. Trend INR. Resume diltiazem. 4. Hx of CVA:  On asa, resume statin. 5. S/p CABG:  On asa, statin. Resume BB when appropriate. 6. Hx of Frequent UTIs: No symptoms currently, monitor  7. Hx of hypothyroid:  On synthroid. 8. HTN: resumed dilt, monitor  9.  Dispo: PT/OT eval. CT's removed today, may need rehab vs     Signed By: David Bray, TRENA

## 2017-06-23 NOTE — PROGRESS NOTES
Problem: Mobility Impaired (Adult and Pediatric)  Goal: *Acute Goals and Plan of Care (Insert Text)  Physical Therapy Goals  Initiated 6/22/2017  1. Patient will move from supine to sit and sit to supine, scoot up and down and roll side to side in bed with independence within 5 day(s). 2. Patient will transfer from bed to chair and chair to bed with modified independence using the least restrictive device within 5 day(s). 3. Patient will perform sit to stand with modified independence within 5 day(s). 4. Patient will ambulate with supervision/set-up for 150 feet with the least restrictive device within 5 day(s). 5. Patient will ascend/descend 1 stair with handrails per home setup with supervision/set-up within 5 day(s). 6. Patient will complete TUG assessment within 5 day(s). PHYSICAL THERAPY TREATMENT  Patient: Lauren Sutherland (80 y.o. female)  Date: 6/23/2017  Diagnosis: AORTIC STENOSIS  Aortic stenosis <principal problem not specified>  Procedure(s) (LRB):  TRANSCATHETER AORTIC VALVE REPLACEMENT, TRANSAPICAL APPROACH, 23MM S3 VALVE (Left) 2 Days Post-Op  Precautions: Fall      ASSESSMENT: Patient sitting in chair on arrival. She needed min assist of 2 to stand. Once standing she was able to ambulate 125 feet using a wheeled walker with min assist to CGA plus another assist for line managment. She needed verbal cues to keep the walker close. Pending progress, patient will need rehab vs 24/7 assist and HHPT at discharge. Progression toward goals:  [ ]    Improving appropriately and progressing toward goals  [X]    Improving slowly and progressing toward goals  [ ]    Not making progress toward goals and plan of care will be adjusted       PLAN:  Patient continues to benefit from skilled intervention to address the above impairments. Continue treatment per established plan of care.   Discharge Recommendations:  Rehab, Home Health and To Be Determined  Further Equipment Recommendations for Discharge: Will continue to assess for home O2       SUBJECTIVE:   Patient stated I will do what I can.       OBJECTIVE DATA SUMMARY:   Critical Behavior:  Neurologic State: Alert  Orientation Level: Oriented X4  Cognition: Appropriate decision making, Appropriate for age attention/concentration, Appropriate safety awareness  Safety/Judgement: Awareness of environment  Functional Mobility Training:  Bed Mobility:   Not assessed, patient up in chair on arrival and returned to the chair   Transfers:  Sit to Stand: Minimum assistance;Assist x2; Additional time (verbal cues)  Stand to Sit: Minimum assistance;Assist x1;Additional time (verbal cues)      Balance:  Sitting: Intact; With support  Standing: Impaired; With support  Standing - Static: Fair  Standing - Dynamic : Fair  Ambulation/Gait Training:  Distance (ft): 125 Feet (ft)  Assistive Device: Gait belt;Walker, rolling  Ambulation - Level of Assistance: Minimal assistance (for balance initially and at times for walker management)   Gait Abnormalities: Decreased step clearance; Other (flexed posture)   Base of Support: Widened   Speed/Rosa: Slow  Step Length: Left shortened;Right shortened     Therapeutic Exercises: Ankle pumps and LAQ x 10 reps  Pain:  Pain Scale 1: Numeric (0 - 10)  Pain Intensity 1: 6  Pain Location 1: Abdomen  Pain Orientation 1: Left  Pain Description 1: Aching     Activity Tolerance:   Sitting /42 HR 90 Standing /56 HR 93 Post activity /50 HR 99 Initial O2 sats in 90's at rest, unable to get good pleth post ambulation  Please refer to the flowsheet for vital signs taken during this treatment.   After treatment:   [X]    Patient left in no apparent distress sitting up in chair  [ ]    Patient left in no apparent distress in bed  [X]    Call bell left within reach  [X]    Nursing notified  [ ]    Caregiver present  [ ]    Bed alarm activated      COMMUNICATION/COLLABORATION:   The patients plan of care was discussed with: Registered Nurse     Breanne Mayer, PT   Time Calculation: 18 mins

## 2017-06-24 ENCOUNTER — APPOINTMENT (OUTPATIENT)
Dept: GENERAL RADIOLOGY | Age: 82
DRG: 267 | End: 2017-06-24
Attending: NURSE PRACTITIONER
Payer: MEDICARE

## 2017-06-24 LAB
ANION GAP BLD CALC-SCNC: 7 MMOL/L (ref 5–15)
ATRIAL RATE: 63 BPM
ATRIAL RATE: 89 BPM
BUN SERPL-MCNC: 16 MG/DL (ref 6–20)
BUN/CREAT SERPL: 24 (ref 12–20)
CALCIUM SERPL-MCNC: 8.2 MG/DL (ref 8.5–10.1)
CALCULATED R AXIS, ECG10: -40 DEGREES
CALCULATED R AXIS, ECG10: -41 DEGREES
CALCULATED T AXIS, ECG11: 137 DEGREES
CALCULATED T AXIS, ECG11: 140 DEGREES
CHLORIDE SERPL-SCNC: 108 MMOL/L (ref 97–108)
CO2 SERPL-SCNC: 22 MMOL/L (ref 21–32)
CREAT SERPL-MCNC: 0.67 MG/DL (ref 0.55–1.02)
DIAGNOSIS, 93000: NORMAL
DIAGNOSIS, 93000: NORMAL
ERYTHROCYTE [DISTWIDTH] IN BLOOD BY AUTOMATED COUNT: 18.6 % (ref 11.5–14.5)
GLUCOSE SERPL-MCNC: 109 MG/DL (ref 65–100)
HCT VFR BLD AUTO: 30.6 % (ref 35–47)
HGB BLD-MCNC: 9.8 G/DL (ref 11.5–16)
INR PPP: 1.6 (ref 0.9–1.1)
MAGNESIUM SERPL-MCNC: 2.4 MG/DL (ref 1.6–2.4)
MCH RBC QN AUTO: 26.8 PG (ref 26–34)
MCHC RBC AUTO-ENTMCNC: 32 G/DL (ref 30–36.5)
MCV RBC AUTO: 83.8 FL (ref 80–99)
PLATELET # BLD AUTO: 75 K/UL (ref 150–400)
POTASSIUM SERPL-SCNC: 4 MMOL/L (ref 3.5–5.1)
PROTHROMBIN TIME: 16.2 SEC (ref 9–11.1)
Q-T INTERVAL, ECG07: 384 MS
Q-T INTERVAL, ECG07: 418 MS
QRS DURATION, ECG06: 110 MS
QRS DURATION, ECG06: 112 MS
QTC CALCULATION (BEZET), ECG08: 462 MS
QTC CALCULATION (BEZET), ECG08: 470 MS
RBC # BLD AUTO: 3.65 M/UL (ref 3.8–5.2)
SODIUM SERPL-SCNC: 137 MMOL/L (ref 136–145)
VENTRICULAR RATE, ECG03: 76 BPM
VENTRICULAR RATE, ECG03: 87 BPM
WBC # BLD AUTO: 6.8 K/UL (ref 3.6–11)

## 2017-06-24 PROCEDURE — 97535 SELF CARE MNGMENT TRAINING: CPT

## 2017-06-24 PROCEDURE — 85610 PROTHROMBIN TIME: CPT | Performed by: NURSE PRACTITIONER

## 2017-06-24 PROCEDURE — 74011250637 HC RX REV CODE- 250/637: Performed by: THORACIC SURGERY (CARDIOTHORACIC VASCULAR SURGERY)

## 2017-06-24 PROCEDURE — 36415 COLL VENOUS BLD VENIPUNCTURE: CPT | Performed by: NURSE PRACTITIONER

## 2017-06-24 PROCEDURE — 71020 XR CHEST PA LAT: CPT

## 2017-06-24 PROCEDURE — 65660000000 HC RM CCU STEPDOWN

## 2017-06-24 PROCEDURE — 77010033678 HC OXYGEN DAILY

## 2017-06-24 PROCEDURE — 74011250637 HC RX REV CODE- 250/637: Performed by: PHYSICIAN ASSISTANT

## 2017-06-24 PROCEDURE — 83735 ASSAY OF MAGNESIUM: CPT | Performed by: NURSE PRACTITIONER

## 2017-06-24 PROCEDURE — 74011250637 HC RX REV CODE- 250/637: Performed by: NURSE PRACTITIONER

## 2017-06-24 PROCEDURE — 93005 ELECTROCARDIOGRAM TRACING: CPT

## 2017-06-24 PROCEDURE — 97116 GAIT TRAINING THERAPY: CPT

## 2017-06-24 PROCEDURE — 85027 COMPLETE CBC AUTOMATED: CPT | Performed by: NURSE PRACTITIONER

## 2017-06-24 PROCEDURE — 80048 BASIC METABOLIC PNL TOTAL CA: CPT | Performed by: NURSE PRACTITIONER

## 2017-06-24 RX ORDER — WARFARIN SODIUM 5 MG/1
5 TABLET ORAL ONCE
Status: COMPLETED | OUTPATIENT
Start: 2017-06-24 | End: 2017-06-24

## 2017-06-24 RX ADMIN — Medication 10 ML: at 15:28

## 2017-06-24 RX ADMIN — Medication 10 ML: at 07:16

## 2017-06-24 RX ADMIN — ROSUVASTATIN CALCIUM 40 MG: 40 TABLET, FILM COATED ORAL at 21:08

## 2017-06-24 RX ADMIN — FAMOTIDINE 20 MG: 20 TABLET ORAL at 21:08

## 2017-06-24 RX ADMIN — FUROSEMIDE 20 MG: 20 TABLET ORAL at 09:01

## 2017-06-24 RX ADMIN — FAMOTIDINE 20 MG: 20 TABLET ORAL at 09:01

## 2017-06-24 RX ADMIN — LEVOTHYROXINE SODIUM 125 MCG: 125 TABLET ORAL at 07:16

## 2017-06-24 RX ADMIN — CHLORHEXIDINE GLUCONATE 10 ML: 1.2 RINSE ORAL at 09:02

## 2017-06-24 RX ADMIN — SERTRALINE HYDROCHLORIDE 50 MG: 50 TABLET ORAL at 09:01

## 2017-06-24 RX ADMIN — Medication 10 ML: at 21:09

## 2017-06-24 RX ADMIN — ASPIRIN 81 MG 81 MG: 81 TABLET ORAL at 09:01

## 2017-06-24 RX ADMIN — DILTIAZEM HYDROCHLORIDE 180 MG: 180 CAPSULE, EXTENDED RELEASE ORAL at 21:08

## 2017-06-24 RX ADMIN — WARFARIN SODIUM 5 MG: 5 TABLET ORAL at 17:27

## 2017-06-24 RX ADMIN — Medication 400 MG: at 21:08

## 2017-06-24 RX ADMIN — Medication 400 MG: at 09:01

## 2017-06-24 RX ADMIN — CHLORHEXIDINE GLUCONATE 10 ML: 1.2 RINSE ORAL at 21:09

## 2017-06-24 NOTE — PROGRESS NOTES
8652: off floor to radiology. 8368: pt returned to floor tele placed and confirmed with monitor tech. 1930: Bedside and Verbal shift change report given to padma doshi rn (oncoming nurse) by kris park rn (offgoing nurse). Report included the following information SBAR, Kardex, OR Summary, Intake/Output, MAR and Recent Results.

## 2017-06-24 NOTE — PROGRESS NOTES
0730: Bedside and Verbal shift change report given to Gina Polanco RN (oncoming nurse) by Ethan Bhatt (offgoing nurse). Report included the following information SBAR, Kardex, Procedure Summary, Intake/Output, MAR and Recent Results. 1930: Bedside and Verbal shift change report given to Ethan Bhatt (oncoming nurse) by Gina Polanco RN (offgoing nurse). Report included the following information SBAR, Kardex, ED Summary, Procedure Summary, Intake/Output, MAR and Recent Results.

## 2017-06-24 NOTE — CARDIO/PULMONARY
Cardiac Wellness; Visited to provide post discharge TAVR education to hospitals, who is a retierd nurse. Discussed post TAVR discharge instructions including deep breathing, coughing and use of incentive spirometry as well as pain management, daily temperature and weight monitoring, medication compliance and routine follow up with her physicians. Stressed the importance of following a Heart Healthy, low sodium diet. hospitals demonstrated back proper use of the IS achieving 500 ml. Red reminder bracelet is in place and hospitals is aware of the purpose of the bracelet. Discussed the purpose and format of outpatient Cardiac Wellness and encouraged enrollment.  hospitals  Has participated before and the CWP at [facility] is closer to her home so the contact information was documented on her Michelle Tiwari RN

## 2017-06-24 NOTE — PROGRESS NOTES
Problem: Self Care Deficits Care Plan (Adult)  Goal: *Acute Goals and Plan of Care (Insert Text)  Occupational Therapy Goals  Initiated 6/22/2017  1. Patient will perform ADLs standing 5 mins without fatigue or LOB with modified independence within 7 day(s). 2. Patient will perform lower body ADLs with modified independence within 7 day(s). 3. Patient will perform bathing with modified independence within 7 day(s). 4. Patient will perform toilet transfers with modified independence within 7 day(s). 5. Patient will perform all aspects of toileting with modified independence within 7 day(s). 6. Patient will participate in cardiac/sternal upper extremity therapeutic exercise/activities to increase independence with ADLs with modified independence for 5 minutes within 7 day(s). OCCUPATIONAL THERAPY TREATMENT  Patient: Khushbu Larson (80 y.o. female)  Date: 6/24/2017  Diagnosis: AORTIC STENOSIS  Aortic stenosis <principal problem not specified>  Procedure(s) (LRB):  TRANSCATHETER AORTIC VALVE REPLACEMENT, TRANSAPICAL APPROACH, 23MM S3 VALVE (Left) 3 Days Post-Op  Precautions: Fall      ASSESSMENT:  Patient progressing in all areas, sitting 3 hours, sit<>stand supervision, standing tolerance 8 mins to groom with min A RW, bed mobility moderate A LE A, functional mobility to and from the bathroom with supervision RW, toilet transfer supervision, and toileting ADLs with modified independence RW. ADLs continue to be limited by standing tolerance, standing balance, cardiopulmonary tolerance (3L NC, 95% sats, HR , /78), ROM B UEs and LEs, and pain management. Patient is highly motivated to be independent, can tolerate 3 hours so plans on discharging home with daughter A PRN. However,  pending progression of overall activity tolerance, cardiopulmonary tolerance patient may actually need rehab.       Recommend with nursing patient to continue to complete as able in order to maintain strength, endurance and independence: ADLs with supervision/setup, OOB to chair 3x/day and mobilizing to the bathroom for toileting with 1 assist and RW Thank you for your assistance. Progression toward goals:  [X]       Improving appropriately and progressing toward goals  [ ]       Improving slowly and progressing toward goals  [ ]       Not making progress toward goals and plan of care will be adjusted       PLAN:  Patient continues to benefit from skilled intervention to address the above impairments. Continue treatment per established plan of care. Discharge Recommendations:  Rehab, Home Health and To Be Determined  Further Equipment Recommendations for Discharge:  TBD       SUBJECTIVE:   Patient stated I want to try.       OBJECTIVE DATA SUMMARY:   Cognitive/Behavioral Status:  Neurologic State: Alert  Orientation Level: Oriented X4  Cognition: Appropriate decision making; Appropriate for age attention/concentration; Appropriate safety awareness; Follows commands  Perception: Appears intact  Perseveration: No perseveration noted  Safety/Judgement: Awareness of environment;Good awareness of safety precautions (demonstrates 3/3 sternal precautions)     Functional Mobility and Transfers for ADLs:  Bed Mobility:  Sit to Supine: Moderate assistance (A B LEs)     Transfers:  Sit to Stand: Supervision (EOB)  Functional Transfers  Bathroom Mobility: Supervision/set up (RW)  Toilet Transfer : Supervision (RW, low commode)     Balance:  Sitting: Intact; Without support  Standing: Impaired; Without support (one hand on supportive surface)  Standing - Static: Fair  Standing - Dynamic : Fair     ADL Intervention:  Patient received sitting EOB, just finishing breakfast. Stating to and from bathroom with nursing staff frequently.    Feeding  Feeding Assistance: Independent     Grooming  Grooming Assistance: Minimum assistance (fatigue limiting addressing hair; leaning on sink; RW)  Washing Face: Supervision/set-up  Washing Hands: Supervision/set-up  Brushing Teeth: Supervision/set-up                             Toileting  Bladder Hygiene: Modified independent  Bowel Hygiene: Modified indpendent  Clothing Management: Modified independent (increased time, RW, pad, brief) education to don L LE first to increase ease     Cognitive Retraining  Safety/Judgement: Awareness of environment;Good awareness of safety precautions (demonstrates 3/3 sternal precautions)   Patient instructed no asymmetrical reaching over head to ensure B UEs when shoulders >90*, prevent deep bending and valsalva maneuvers. Instruction if continued pain at home with shoulder IR for BM hygiene can use wet wipes PRN. May have to adjust home setup to increase ease with items closer to waist height, don clothing tailor sitting and don all clothing while sitting prior to standing. Instruction on upper body dressing techniques of over head, then arms through to decrease pain and unilateral shoulder flexion >90*. Increase activity tolerance for home by pacing self with increasing the arm exercises, sitting duration, frequency OOB, walking, standing, and ADLs. Instructed and indicated understanding of s/s of too much activity, how to respond to s/s safely. Neuro Re-Education:           Therapeutic Exercises:   Encouraged to complete daily. Pain:  Pain Scale 1: Numeric (0 - 10)  Pain Intensity 1: 0              Activity Tolerance:   3L NC, 95% sats, HR , /78  Please refer to the flowsheet for vital signs taken during this treatment.   After treatment:   [ ] Patient left in no apparent distress sitting up in chair  [X] Patient left in no apparent distress in bed  [X] Call bell left within reach  [X] Nursing notified  [ ] Caregiver present  [ ] Bed alarm activated      COMMUNICATION/COLLABORATION:   The patients plan of care was discussed with: Physical Therapist and Registered Nurse     Heather Moss  Time Calculation: 24 mins

## 2017-06-24 NOTE — PROGRESS NOTES
Problem: Cardiac Valve Surgery: Post-Op Day 3  Goal: Activity/Safety  Outcome: Progressing Towards Goal  Up x 1 with walker  Goal: Nutrition/Diet  Outcome: Progressing Towards Goal  Aha diet, fair appetite      Goal: Discharge Planning  Outcome: Progressing Towards Goal  Home with HH once stable v. Rehab/snf  Goal: Respiratory  Outcome: Progressing Towards Goal  Currently requires supplemental oxygen, attempting to wean  Goal: *Stable cardiac rhythm  Outcome: Progressing Towards Goal  Chronic controlled afib on coumadin  Goal: *Optimal pain control at patients stated goal  Outcome: Progressing Towards Goal  Pt educated on pain management regimen  Goal: *Incisions without signs and symptoms of wound complication  Outcome: Progressing Towards Goal  Incisions intact with dermabond    Problem: Pressure Injury - Risk of  Goal: *Prevention of pressure ulcer  Outcome: Progressing Towards Goal  Pt remains free of pressure ulcers    06/24/17 0751   Wound Prevention and Protection Methods   Orientation of Wound Prevention Posterior   Location of Wound Prevention Sacrum/Coccyx   Dressing Present  No   Read Only, Retired: Wound Treatment (non-mechanical)   Wound Offloading (Prevention Methods) Bed, pressure reduction mattress;Turning;Repositioning

## 2017-06-24 NOTE — PROGRESS NOTES
Problem: Mobility Impaired (Adult and Pediatric)  Goal: *Acute Goals and Plan of Care (Insert Text)  Physical Therapy Goals  Initiated 6/22/2017  1. Patient will move from supine to sit and sit to supine, scoot up and down and roll side to side in bed with independence within 5 day(s). 2. Patient will transfer from bed to chair and chair to bed with modified independence using the least restrictive device within 5 day(s). 3. Patient will perform sit to stand with modified independence within 5 day(s). 4. Patient will ambulate with supervision/set-up for 150 feet with the least restrictive device within 5 day(s). 5. Patient will ascend/descend 1 stair with handrails per home setup with supervision/set-up within 5 day(s). 6. Patient will complete TUG assessment within 5 day(s). PHYSICAL THERAPY TREATMENT  Patient: Mai Jules (80 y.o. female)  Date: 6/24/2017  Diagnosis: AORTIC STENOSIS  Aortic stenosis <principal problem not specified>  Procedure(s) (LRB):  TRANSCATHETER AORTIC VALVE REPLACEMENT, TRANSAPICAL APPROACH, 23MM S3 VALVE (Left) 3 Days Post-Op  Precautions: Fall      ASSESSMENT:  Patient received in bed and agreeable to therapy but wishing to return to bed until lunch. Continues to require significant assist with supine to sit but otherwise contact guard to supervision for upright mobility. Ambulated well with rolling walker but still with decreased endurance. Encouraged to be up in chair for lunch. Progression toward goals:  [ ]      Improving appropriately and progressing toward goals  [ ]      Improving slowly and progressing toward goals  [ ]      Not making progress toward goals and plan of care will be adjusted       PLAN:  Patient continues to benefit from skilled intervention to address the above impairments. Continue treatment per established plan of care.   Discharge Recommendations:  Rehab vs Home Health  Further Equipment Recommendations for Discharge:  none       SUBJECTIVE: Patient stated I haven't walked much at all before today. William Gómezkehinde   The patient stated 3/3 sternal precautions. Reviewed all 3 with patient. OBJECTIVE DATA SUMMARY:   Patient mobilized on continuous portable monitor/telemetry. Critical Behavior:  Neurologic State: Alert  Orientation Level: Oriented X4  Cognition: Appropriate decision making, Appropriate for age attention/concentration, Appropriate safety awareness, Follows commands  Safety/Judgement: Awareness of environment, Good awareness of safety precautions (demonstrates 3/3 sternal precautions)  Functional Mobility Training:  Bed Mobility:  Log    Supine to Sit: Moderate assistance  Sit to Supine: Stand-by asssistance           Transfers:  Sit to Stand: Supervision  Stand to Sit: Modified independent                             Balance:  Sitting: Intact  Standing: Impaired; Without support  Standing - Static: Fair  Standing - Dynamic : Fair  Ambulation/Gait Training:  Distance (ft): 112 Feet (ft)  Assistive Device: Gait belt;Walker, rolling  Ambulation - Level of Assistance: Contact guard assistance        Gait Abnormalities: Decreased step clearance              Speed/Rosa: Slow  Step Length: Right shortened;Left shortened                                     Pain:  Pain Scale 1: Numeric (0 - 10)  Pain Intensity 1: 0         Activity Tolerance:   VSS but unable to get pleth on O2 saturation. Please refer to the flowsheet for vital signs taken during this treatment.   After treatment:   [ ] Patient left in no apparent distress sitting up in chair  [X] Patient left in no apparent distress in bed  [X] Call bell left within reach  [X] Nursing notified  [ ] Caregiver present  [ ] Bed alarm activated      COMMUNICATION/COLLABORATION:   The patients plan of care was discussed with: Registered Nurse     Chi Chan, PT   Time Calculation: 18 mins

## 2017-06-25 LAB
ANION GAP BLD CALC-SCNC: 7 MMOL/L (ref 5–15)
ATRIAL RATE: 102 BPM
BUN SERPL-MCNC: 14 MG/DL (ref 6–20)
BUN/CREAT SERPL: 19 (ref 12–20)
CALCIUM SERPL-MCNC: 8.5 MG/DL (ref 8.5–10.1)
CALCULATED R AXIS, ECG10: -46 DEGREES
CALCULATED T AXIS, ECG11: 140 DEGREES
CHLORIDE SERPL-SCNC: 107 MMOL/L (ref 97–108)
CO2 SERPL-SCNC: 24 MMOL/L (ref 21–32)
CREAT SERPL-MCNC: 0.74 MG/DL (ref 0.55–1.02)
DIAGNOSIS, 93000: NORMAL
ERYTHROCYTE [DISTWIDTH] IN BLOOD BY AUTOMATED COUNT: 18.4 % (ref 11.5–14.5)
GLUCOSE SERPL-MCNC: 113 MG/DL (ref 65–100)
HCT VFR BLD AUTO: 32.8 % (ref 35–47)
HGB BLD-MCNC: 10.3 G/DL (ref 11.5–16)
INR PPP: 1.6 (ref 0.9–1.1)
MAGNESIUM SERPL-MCNC: 2.4 MG/DL (ref 1.6–2.4)
MCH RBC QN AUTO: 25.9 PG (ref 26–34)
MCHC RBC AUTO-ENTMCNC: 31.4 G/DL (ref 30–36.5)
MCV RBC AUTO: 82.4 FL (ref 80–99)
PLATELET # BLD AUTO: 90 K/UL (ref 150–400)
POTASSIUM SERPL-SCNC: 4 MMOL/L (ref 3.5–5.1)
PROTHROMBIN TIME: 16.3 SEC (ref 9–11.1)
Q-T INTERVAL, ECG07: 382 MS
QRS DURATION, ECG06: 108 MS
QTC CALCULATION (BEZET), ECG08: 459 MS
RBC # BLD AUTO: 3.98 M/UL (ref 3.8–5.2)
SODIUM SERPL-SCNC: 138 MMOL/L (ref 136–145)
VENTRICULAR RATE, ECG03: 87 BPM
WBC # BLD AUTO: 6.3 K/UL (ref 3.6–11)

## 2017-06-25 PROCEDURE — 74011250637 HC RX REV CODE- 250/637: Performed by: PHYSICIAN ASSISTANT

## 2017-06-25 PROCEDURE — 97530 THERAPEUTIC ACTIVITIES: CPT | Performed by: PHYSICAL THERAPIST

## 2017-06-25 PROCEDURE — 85610 PROTHROMBIN TIME: CPT | Performed by: PHYSICIAN ASSISTANT

## 2017-06-25 PROCEDURE — 85027 COMPLETE CBC AUTOMATED: CPT | Performed by: PHYSICIAN ASSISTANT

## 2017-06-25 PROCEDURE — 83735 ASSAY OF MAGNESIUM: CPT | Performed by: PHYSICIAN ASSISTANT

## 2017-06-25 PROCEDURE — 74011250637 HC RX REV CODE- 250/637: Performed by: NURSE PRACTITIONER

## 2017-06-25 PROCEDURE — 77010033678 HC OXYGEN DAILY

## 2017-06-25 PROCEDURE — 93005 ELECTROCARDIOGRAM TRACING: CPT

## 2017-06-25 PROCEDURE — 74011250637 HC RX REV CODE- 250/637: Performed by: THORACIC SURGERY (CARDIOTHORACIC VASCULAR SURGERY)

## 2017-06-25 PROCEDURE — 80048 BASIC METABOLIC PNL TOTAL CA: CPT | Performed by: PHYSICIAN ASSISTANT

## 2017-06-25 PROCEDURE — 97116 GAIT TRAINING THERAPY: CPT | Performed by: PHYSICAL THERAPIST

## 2017-06-25 PROCEDURE — 36415 COLL VENOUS BLD VENIPUNCTURE: CPT | Performed by: PHYSICIAN ASSISTANT

## 2017-06-25 PROCEDURE — 65660000000 HC RM CCU STEPDOWN

## 2017-06-25 RX ORDER — WARFARIN 7.5 MG/1
7.5 TABLET ORAL ONCE
Status: COMPLETED | OUTPATIENT
Start: 2017-06-25 | End: 2017-06-25

## 2017-06-25 RX ADMIN — Medication 400 MG: at 21:56

## 2017-06-25 RX ADMIN — CHLORHEXIDINE GLUCONATE 10 ML: 1.2 RINSE ORAL at 08:08

## 2017-06-25 RX ADMIN — FAMOTIDINE 20 MG: 20 TABLET ORAL at 08:07

## 2017-06-25 RX ADMIN — WARFARIN SODIUM 7.5 MG: 7.5 TABLET ORAL at 17:10

## 2017-06-25 RX ADMIN — FAMOTIDINE 20 MG: 20 TABLET ORAL at 21:56

## 2017-06-25 RX ADMIN — FUROSEMIDE 20 MG: 20 TABLET ORAL at 08:07

## 2017-06-25 RX ADMIN — LEVOTHYROXINE SODIUM 125 MCG: 125 TABLET ORAL at 06:59

## 2017-06-25 RX ADMIN — DILTIAZEM HYDROCHLORIDE 180 MG: 180 CAPSULE, EXTENDED RELEASE ORAL at 21:56

## 2017-06-25 RX ADMIN — Medication 400 MG: at 08:07

## 2017-06-25 RX ADMIN — Medication 10 ML: at 06:59

## 2017-06-25 RX ADMIN — Medication 10 ML: at 15:27

## 2017-06-25 RX ADMIN — Medication 10 ML: at 21:56

## 2017-06-25 RX ADMIN — ASPIRIN 81 MG 81 MG: 81 TABLET ORAL at 08:07

## 2017-06-25 RX ADMIN — ROSUVASTATIN CALCIUM 40 MG: 40 TABLET, FILM COATED ORAL at 21:56

## 2017-06-25 RX ADMIN — CHLORHEXIDINE GLUCONATE 10 ML: 1.2 RINSE ORAL at 21:56

## 2017-06-25 RX ADMIN — SERTRALINE HYDROCHLORIDE 50 MG: 50 TABLET ORAL at 08:07

## 2017-06-25 NOTE — PROGRESS NOTES
Problem: Mobility Impaired (Adult and Pediatric)  Goal: *Acute Goals and Plan of Care (Insert Text)  Physical Therapy Goals  Initiated 6/22/2017  1. Patient will move from supine to sit and sit to supine, scoot up and down and roll side to side in bed with independence within 5 day(s). 2. Patient will transfer from bed to chair and chair to bed with modified independence using the least restrictive device within 5 day(s). 3. Patient will perform sit to stand with modified independence within 5 day(s). 4. Patient will ambulate with supervision/set-up for 150 feet with the least restrictive device within 5 day(s). 5. Patient will ascend/descend 1 stair with handrails per home setup with supervision/set-up within 5 day(s). 6. Patient will complete TUG assessment within 5 day(s). PHYSICAL THERAPY TREATMENT  Patient: Abiel Sykes (80 y.o. female)  Date: 6/25/2017  Diagnosis: AORTIC STENOSIS  Aortic stenosis <principal problem not specified>  Procedure(s) (LRB):  TRANSCATHETER AORTIC VALVE REPLACEMENT, TRANSAPICAL APPROACH, 23MM S3 VALVE (Left) 4 Days Post-Op  Precautions: Fall      ASSESSMENT:  Ms Haile Pop is progressing gradually with skilled PT services. She was able to perform bed mobility with supervision today with use of bed rails. She ambulates 250 feet with rolling walker and standby assistance. Patient demonstrates path deviations, poor walker management and trunk sway however she reports her ambulation is at her baseline. She reports using either a wheelchair walker or cane at home. PT instructs patient she should not use cane at this time due to unsteady gait and she verbalizes understanding. Recommend home with daughter's assistance and home health PT at discharge.     Progression toward goals:  [X]      Improving appropriately and progressing toward goals  [ ]      Improving slowly and progressing toward goals  [ ]      Not making progress toward goals and plan of care will be adjusted PLAN:  Patient continues to benefit from skilled intervention to address the above impairments. Continue treatment per established plan of care. Discharge Recommendations:  Home Health  Further Equipment Recommendations for Discharge:  none       SUBJECTIVE:   Patient stated I'm walking the same as I did when I got here.        OBJECTIVE DATA SUMMARY:   Patient mobilized on continuous portable monitor/telemetry. Critical Behavior:  Neurologic State: Alert, Appropriate for age  Orientation Level: Oriented X4  Cognition: Appropriate decision making, Appropriate for age attention/concentration, Appropriate safety awareness, Follows commands  Safety/Judgement: Awareness of environment, Good awareness of safety precautions (demonstrates 3/3 sternal precautions)  Functional Mobility Training:  Bed Mobility:  Supine to Sit: Supervision; Additional time; Adaptive equipment              Transfers:  Sit to Stand: Stand-by asssistance; Additional time  Stand to Sit: Supervision                             Balance:  Sitting: Intact  Standing: Intact; With support  Ambulation/Gait Training:  Distance (ft): 250 Feet (ft)  Assistive Device: Walker, rolling  Ambulation - Level of Assistance: Stand-by asssistance        Gait Abnormalities: Path deviations;Trunk sway increased        Base of Support: Widened        Step Length: Right shortened;Left shortened        Pain:  Pain Scale 1: Numeric (0 - 10)  Pain Intensity 1: 0              Activity Tolerance:   SpO2 100% on room air and HR 95 bpm following gait training   Please refer to the flowsheet for vital signs taken during this treatment.   After treatment:   [X] Patient left in no apparent distress sitting up in chair  [ ] Patient left in no apparent distress in bed  [X] Call bell left within reach  [X] Nursing notified  [ ] Caregiver present  [ ] Bed alarm activated      COMMUNICATION/COLLABORATION:   The patients plan of care was discussed with: Registered Nurse     Nhung Amaya Dea Melendez, PT, DPT   Time Calculation: 27 mins

## 2017-06-25 NOTE — PROGRESS NOTES
[facility] FLOOR Progress Note    Admit Date: 2017  POD: 4 Days Post-Op      Procedure:  Procedure(s):  TRANSCATHETER AORTIC VALVE REPLACEMENT, TRANSAPICAL APPROACH, 23MM S3 VALVE    Subjective:   Pt seen with Dr. Felicia Matias. Feels better today. Moving more. On RA. Objective:     Visit Vitals    /60 (BP 1 Location: Right arm, BP Patient Position: Sitting)    Pulse 89    Temp 98.6 °F (37 °C)    Resp 18    Ht 5' 6\" (1.676 m)    Wt 171 lb 15.3 oz (78 kg)    SpO2 94%    Breastfeeding No    BMI 27.75 kg/m2     Temp (24hrs), Av.9 °F (37.2 °C), Min:98.6 °F (37 °C), Max:99.3 °F (37.4 °C)    Last 24hr Input/Output:    Intake/Output Summary (Last 24 hours) at 17 1028  Last data filed at 17 0636   Gross per 24 hour   Intake              780 ml   Output             1250 ml   Net             -470 ml      EKG/Rhythm: A-fib    Oxygen: RA    Admission Weight: Last Weight   Weight: 168 lb 10.4 oz (76.5 kg) Weight: 171 lb 15.3 oz (78 kg)       EXAM:  General: NAD   Lungs:   Clear to auscultation bilaterally. Incision:  No erythema, drainage or swelling. Heart:  Regular rate and rhythm, S1, S2 normal, no murmur, click, rub or gallop. Abdomen:   Soft, non-tender. Bowel sounds normal. No masses,  No organomegaly. Extremities:  No edema. PPP   Neurologic:  Gross motor and sensory apparatus intact. Activity: ad yaquelin    Diet:  Heart healthy    Lab Data Reviewed:   Recent Labs      17   0325   WBC  6.3   HGB  10.3*   HCT  32.8*   PLT  90*   NA  138   K  4.0   BUN  14   CREA  0.74   GLU  113*   INR  1.6*         Assessment:     Active Problems:    Nonrheumatic aortic valve stenosis (2012)      Aortic stenosis (2017)      S/P TAVR (transcatheter aortic valve replacement) (2017)         Plan/Recommendations/Medical Decision Makin. S/p Transapical TAVR: Stable. On asa. No Plavix (pt already on coumadin for a fib). 2. Atelectasis:  On RA. Increase IS and activity as tolerated. On lasix   3. Hx of Chronic Afib: Cont coumadin. Trend INR. On diltiazem. 4. Hx of CVA:  On asa, resume statin. 5. S/p CABG:  On asa, statin. Resume BB when appropriate. 6. Hx of Frequent UTIs: No symptoms currently, monitor  7. Hx of hypothyroid:  On synthroid. 8. HTN: resumed dilt, monitor  9. Dispo: PT/OT. HH vs Rehab. Patient needs to ambulate.     Signed By: NAYELI Lama     Saw patient, agree with above  Risk of morbidity and mortality - high  Medical decision making - high complexity

## 2017-06-25 NOTE — PROGRESS NOTES
Problem: Cardiac Valve Surgery: Post-Op Day 4  Goal: Activity/Safety  Outcome: Progressing Towards Goal  Up x 1  Goal: Respiratory  Outcome: Progressing Towards Goal  Weaned to room air        Problem: Pressure Injury - Risk of  Goal: *Prevention of pressure ulcer  Outcome: Progressing Towards Goal    06/25/17 0748   Wound Prevention and Protection Methods   Orientation of Wound Prevention Posterior   Location of Wound Prevention Sacrum/Coccyx   Dressing Present  No   Read Only, Retired: Wound Treatment (non-mechanical)   Wound Offloading (Prevention Methods) Bed, pressure reduction mattress;Repositioning;Turning   Pt remains free of pressure ulcers    Problem: Falls - Risk of  Goal: *Absence of falls  Outcome: Progressing Towards Goal  Pt remains free of falls, up x 1    1930: pt ambulated x 2, up to chair several hours total today. Bedside and Verbal shift change report given to jeannine mackenzie rn (oncoming nurse) by kris park rn (offgoing nurse). Report included the following information SBAR, Kardex, Procedure Summary, Intake/Output, MAR and Recent Results.

## 2017-06-26 ENCOUNTER — TELEPHONE ANTICOAG (OUTPATIENT)
Dept: CARDIOTHORACIC SURGERY | Age: 82
End: 2017-06-26

## 2017-06-26 VITALS
DIASTOLIC BLOOD PRESSURE: 50 MMHG | HEIGHT: 66 IN | TEMPERATURE: 97.7 F | SYSTOLIC BLOOD PRESSURE: 158 MMHG | WEIGHT: 171.52 LBS | RESPIRATION RATE: 18 BRPM | HEART RATE: 75 BPM | BODY MASS INDEX: 27.57 KG/M2 | OXYGEN SATURATION: 100 %

## 2017-06-26 LAB
ANION GAP BLD CALC-SCNC: 9 MMOL/L (ref 5–15)
ATRIAL RATE: 69 BPM
BUN SERPL-MCNC: 16 MG/DL (ref 6–20)
BUN/CREAT SERPL: 20 (ref 12–20)
CALCIUM SERPL-MCNC: 8.3 MG/DL (ref 8.5–10.1)
CALCULATED R AXIS, ECG10: -48 DEGREES
CALCULATED T AXIS, ECG11: 141 DEGREES
CHLORIDE SERPL-SCNC: 106 MMOL/L (ref 97–108)
CO2 SERPL-SCNC: 23 MMOL/L (ref 21–32)
CREAT SERPL-MCNC: 0.79 MG/DL (ref 0.55–1.02)
DIAGNOSIS, 93000: NORMAL
ERYTHROCYTE [DISTWIDTH] IN BLOOD BY AUTOMATED COUNT: 18.5 % (ref 11.5–14.5)
GLUCOSE SERPL-MCNC: 114 MG/DL (ref 65–100)
HCT VFR BLD AUTO: 32 % (ref 35–47)
HGB BLD-MCNC: 10.2 G/DL (ref 11.5–16)
INR PPP: 1.5 (ref 0.9–1.1)
MAGNESIUM SERPL-MCNC: 2.3 MG/DL (ref 1.6–2.4)
MCH RBC QN AUTO: 26.2 PG (ref 26–34)
MCHC RBC AUTO-ENTMCNC: 31.9 G/DL (ref 30–36.5)
MCV RBC AUTO: 82.3 FL (ref 80–99)
PLATELET # BLD AUTO: 118 K/UL (ref 150–400)
POTASSIUM SERPL-SCNC: 3.7 MMOL/L (ref 3.5–5.1)
PROTHROMBIN TIME: 15.2 SEC (ref 9–11.1)
Q-T INTERVAL, ECG07: 416 MS
QRS DURATION, ECG06: 114 MS
QTC CALCULATION (BEZET), ECG08: 458 MS
RBC # BLD AUTO: 3.89 M/UL (ref 3.8–5.2)
SODIUM SERPL-SCNC: 138 MMOL/L (ref 136–145)
VENTRICULAR RATE, ECG03: 73 BPM
WBC # BLD AUTO: 5.2 K/UL (ref 3.6–11)

## 2017-06-26 PROCEDURE — 80048 BASIC METABOLIC PNL TOTAL CA: CPT | Performed by: PHYSICIAN ASSISTANT

## 2017-06-26 PROCEDURE — 36415 COLL VENOUS BLD VENIPUNCTURE: CPT | Performed by: PHYSICIAN ASSISTANT

## 2017-06-26 PROCEDURE — 97116 GAIT TRAINING THERAPY: CPT

## 2017-06-26 PROCEDURE — 83735 ASSAY OF MAGNESIUM: CPT | Performed by: PHYSICIAN ASSISTANT

## 2017-06-26 PROCEDURE — 85027 COMPLETE CBC AUTOMATED: CPT | Performed by: PHYSICIAN ASSISTANT

## 2017-06-26 PROCEDURE — 74011250637 HC RX REV CODE- 250/637: Performed by: THORACIC SURGERY (CARDIOTHORACIC VASCULAR SURGERY)

## 2017-06-26 PROCEDURE — 85610 PROTHROMBIN TIME: CPT | Performed by: PHYSICIAN ASSISTANT

## 2017-06-26 PROCEDURE — 93005 ELECTROCARDIOGRAM TRACING: CPT

## 2017-06-26 PROCEDURE — 74011250637 HC RX REV CODE- 250/637: Performed by: NURSE PRACTITIONER

## 2017-06-26 RX ORDER — AMOXICILLIN 250 MG
1 CAPSULE ORAL
Qty: 60 TAB | Refills: 1 | Status: SHIPPED | OUTPATIENT
Start: 2017-06-26 | End: 2017-07-24

## 2017-06-26 RX ORDER — WARFARIN 7.5 MG/1
7.5 TABLET ORAL ONCE
Status: DISCONTINUED | OUTPATIENT
Start: 2017-06-26 | End: 2017-06-26 | Stop reason: HOSPADM

## 2017-06-26 RX ORDER — ATENOLOL 25 MG/1
25 TABLET ORAL DAILY
Status: DISCONTINUED | OUTPATIENT
Start: 2017-06-26 | End: 2017-06-26 | Stop reason: HOSPADM

## 2017-06-26 RX ORDER — TRAMADOL HYDROCHLORIDE 50 MG/1
50 TABLET ORAL
Qty: 30 TAB | Refills: 0 | Status: SHIPPED | OUTPATIENT
Start: 2017-06-26

## 2017-06-26 RX ADMIN — DOCUSATE SODIUM AND SENNOSIDES 1 TABLET: 8.6; 5 TABLET, FILM COATED ORAL at 09:00

## 2017-06-26 RX ADMIN — FUROSEMIDE 20 MG: 20 TABLET ORAL at 09:01

## 2017-06-26 RX ADMIN — FAMOTIDINE 20 MG: 20 TABLET ORAL at 09:00

## 2017-06-26 RX ADMIN — Medication 400 MG: at 09:01

## 2017-06-26 RX ADMIN — Medication 10 ML: at 06:38

## 2017-06-26 RX ADMIN — LEVOTHYROXINE SODIUM 125 MCG: 125 TABLET ORAL at 06:38

## 2017-06-26 RX ADMIN — ASPIRIN 81 MG 81 MG: 81 TABLET ORAL at 09:00

## 2017-06-26 RX ADMIN — SERTRALINE HYDROCHLORIDE 50 MG: 50 TABLET ORAL at 09:00

## 2017-06-26 RX ADMIN — ATENOLOL 25 MG: 25 TABLET ORAL at 12:08

## 2017-06-26 NOTE — PROGRESS NOTES
[facility] FLOOR Progress Note    Admit Date: 2017  POD: 5 Days Post-Op      Procedure:  Procedure(s):  TRANSCATHETER AORTIC VALVE REPLACEMENT, TRANSAPICAL APPROACH, 23MM S3 VALVE    Subjective:   Pt seen with Dr. Colin Stacy. Feels well, no complaints. Wants to go home. +BM    Objective:     Visit Vitals    /57 (BP 1 Location: Right arm)    Pulse 80    Temp 99.2 °F (37.3 °C)    Resp 18    Ht 5' 6\" (1.676 m)    Wt 171 lb 8.3 oz (77.8 kg)    SpO2 98%    Breastfeeding No    BMI 27.68 kg/m2     Temp (24hrs), Av.1 °F (37.3 °C), Min:98.9 °F (37.2 °C), Max:99.4 °F (37.4 °C)    Last 24hr Input/Output:    Intake/Output Summary (Last 24 hours) at 17 0955  Last data filed at 17 0859   Gross per 24 hour   Intake              960 ml   Output             2150 ml   Net            -1190 ml      EKG/Rhythm: A-fib    Oxygen: RA    Admission Weight: Last Weight   Weight: 168 lb 10.4 oz (76.5 kg) Weight: 171 lb 8.3 oz (77.8 kg)       EXAM:  General: NAD   Lungs:   Clear to auscultation bilaterally. Incision:  Groin sites C/D/I   Heart:  Irregular rate and rhythm, S1, S2 normal, no murmur, click, rub or gallop. Abdomen:   Soft, non-tender. Bowel sounds normal. No masses,  No organomegaly. Extremities:  No edema. PPP   Neurologic:  Gross motor and sensory apparatus intact. Activity: ad yaquelin    Diet:  Heart healthy    Lab Data Reviewed:   Recent Labs      17   0408   WBC  5.2   HGB  10.2*   HCT  32.0*   PLT  118*   NA  138   K  3.7   BUN  16   CREA  0.79   GLU  114*   INR  1.5*         Assessment:     Active Problems:    Nonrheumatic aortic valve stenosis (2012)      Aortic stenosis (2017)      S/P TAVR (transcatheter aortic valve replacement) (2017)         Plan/Recommendations/Medical Decision Makin. S/p Transapical TAVR: Stable. On asa. No Plavix (pt already on coumadin for a fib). 2. Atelectasis:  On RA. Increase IS and activity as tolerated. Cont lasix   3.  Hx of Chronic Afib: Cont coumadin. Trend INR. On diltiazem. 4. Hx of CVA:  On asa, statin. 5. S/p CABG: On asa, statin. Resume BB today  6. Hx of Frequent UTIs: No symptoms currently, monitor  7. Hx of hypothyroid:  On synthroid. 8. HTN: cont dilt, resume atenolol today  9. Dispo: PT/OT.  Did better with therapy yesterday, if walks well today home with home health PT    Signed By: Panfilo Tinajero, NP

## 2017-06-26 NOTE — PROGRESS NOTES
Cardiac Surgery Care Coordinator- Met with Any Childers, reviewed plan of care and discussed potential discharge date. Encouraged continued use of the incentive spirometer. Reviewed goals for the day and emphasized the importance of increased activity to meet discharge goals. Began discharge instructions and reviewed purpose of the red reminder bracelet. Located valve ID card and prophylactic antibiotic in the blue discharge folder. Reviewed purpose of the valve cards. Will continue to follow for educational and emotional needs.  Bennett Anguiano RN

## 2017-06-26 NOTE — DISCHARGE SUMMARY
[facility] Discharge Summary     Patient ID:  Celi Son  [id number]  77 y.o.  12/1/1932    Admit date: 6/21/2017    Discharge date: 6/26/2017     Admitting Physician: Llana Apley, MD     Referring Cardiologist:  Dr. Mic Leonard    PCP:  Hugh Patel MD    Admitting Diagnoses:   1. AS  Discharge Diagnoses:     Hospital Problems  Date Reviewed: 6/21/2017          Codes Class Noted POA    S/P TAVR (transcatheter aortic valve replacement) ICD-10-CM: Z95.2  ICD-9-CM: V43.3  6/22/2017 Unknown        Aortic stenosis ICD-10-CM: I35.0  ICD-9-CM: 424.1  6/21/2017 Unknown        Nonrheumatic aortic valve stenosis ICD-10-CM: I35.0  ICD-9-CM: 424.1  4/8/2012 Yes              Discharged Condition: good    Disposition: home, see patient instructions for treatment and plan    Procedures for this admission:  Procedure(s):  TRANSCATHETER AORTIC VALVE REPLACEMENT, TRANSAPICAL APPROACH, 23MM S3 VALVE    Discharge Medications:      Medication List      START taking these medications          senna-docusate 8.6-50 mg per tablet   Dose:  1 Tab   Instructions: Take 1 Tab by mouth two (2) times daily as needed for Constipation for up to 60 days. Commonly known as:  PERICOLACE       traMADol 50 mg tablet   Dose:  50 mg   Instructions: Take 1 Tab by mouth every six (6) hours as needed. Max Daily Amount: 200 mg.    Commonly known as:  ULTRAM         CONTINUE taking these medications          aspirin delayed-release 81 mg tablet   Dose:  81 mg       atenolol 25 mg tablet   Dose:  25 mg   Commonly known as:  TENORMIN       Biotin 2,500 mcg Cap   Dose:  1 Cap       cholecalciferol 1,000 unit Cap   Dose:  1000 Units   Commonly known as:  VITAMIN D3       CRESTOR 10 mg tablet   Dose:  40 mg   Generic drug:  rosuvastatin       cyanocobalamin 500 mcg tablet   Dose:  500 mcg   Commonly known as:  VITAMIN B12       dilTIAZem  mg Tb24 tablet   Dose:  180 mg   Commonly known as:  CARDIZEM LA       furosemide 20 mg tablet   Commonly known as: LASIX       levothyroxine 125 mcg tablet   Commonly known as:  SYNTHROID       nitroglycerin 0.4 mg SL tablet   Dose:  0.4 mg   Instructions:  1 Tab by SubLINGual route as needed for Chest Pain. Commonly known as:  NITROSTAT       potassium chloride SR 10 mEq tablet   Dose:  20 mEq   Commonly known as:  KLOR-CON 10       sertraline 50 mg tablet   Instructions:  TAKE ONE TABLET BY MOUTH EVERY DAY   Commonly known as:  ZOLOFT       TYLENOL 325 mg tablet   Dose:  650 mg   Generic drug:  acetaminophen       warfarin 7.5 mg tablet   Dose:  7.5 mg   Commonly known as:  COUMADIN         STOP taking these medications          cephALEXin 500 mg capsule   Commonly known as:  KEFLEX       diphenhydrAMINE 50 mg tablet   Commonly known as:  BENADRYL       enoxaparin 80 mg/0.8 mL injection   Commonly known as:  LOVENOX       isosorbide mononitrate ER 30 mg tablet   Commonly known as:  IMDUR       predniSONE 50 mg tablet   Commonly known as:  DELTASONE            Where to Get Your Medications      Information about where to get these medications is not yet available     ! Ask your nurse or doctor about these medications     senna-docusate 8.6-50 mg per tablet    traMADol 50 mg tablet             INR TARGET- 2-3  INR LEVEL to be drawn on Tuesday by home health. INR management per Dr. Yesy Guzman office. HPI:   Teressa Arnold is a 80 y.o.  female who was referred for cardiac evaluation, with PMHx of AS, rheumatic fever, MI (2012), HTN, HLD, PVD, Afib (warfarin), CAD s/p CABG (1994 by Dr. Davy Hsu) s/p multiple PCI multiple (MARIE to SVG-L Cx 8/2011, MARIE to SVG-Diag graft 10/2014), Carotid stensios s/p R CEA (1996), PAD with intermittent claudication, CVA s/p cerebellar infarcts, Hypothyroid, Nephrolithiasis, recurrent UTIs, past tobacco abuse (smoked for 2 yrs and quit 40 yrs ago) that is referred by Dr. Zia Dewitt  Ms. Marquez has been followed by Dr. Mayte Ott for several years and most recently has been hospitalized at [facility] in [facility] (4/2017) for CHF, recurrent exertional CP with minimal activity, worsening SOB/HARRISON and dizziness. She underwent TTE, cardiac cath and was discharged with a BB & an increased diuretic dosing from prn to daily. Previously, Ms. Tangela Lopez has been hospitalized for CHF in Jan 2015 and in Feb 2016.   Everrett Mons  Today Ms. Marquez reports persistent SOB/HARRISON with modest activity, but baseline for her. She still has occasional exertional CP and arm pain. She also has some general extremity weakness and mild LE edema that she says is improved on her daily diuretic regimen. She denies orthopnea, PND, syncope or falls.      Ms. Marquez lives with her daughter and is independent in the ADLs but her daughter assists with medication administration d/t her short term memory issues. Hospital Course:   On 6/21/17 the pt underwent a TAVR procedure, performed by Dr. Gerhardt Blend and Dr. Fiona Katz. Please see operative report for full details. She was transferred to the ICU in stable condition on the following drips: cardene. POD#1: 1. S/p Transapical TAVR: Stable. On asa. No Plavix (pt already on coumadin for a fib). 2. Atelectasis:  Wean oxygen for 02 sat > 90%. Increase IS and activity as tolerated. 3. Hx of Chronic Afib:  Resume coumadin tonight. Trend INR. Resume diltiazem. 4. Hx of CVA:  On asa, resume statin. 5. S/p CABG:  On asa, statin. Resume BB when appropriate. 6. Hx of Frequent UTIs: On ancef. 7. Hx of hypothyroid:  On synthroid. 8. HTN: Wean cardene for SPB < 140. resume dilt. Resume other meds as BP allows. Monitor. 9. Dispo: PT/OT eval.  Transfer to stepdown. D/c [facility]. POD#2: 1. S/p Transapical TAVR: Stable. On asa. No Plavix (pt already on coumadin for a fib). 2. Atelectasis:  Wean oxygen for 02 sat > 90%. Increase IS and activity as tolerated. Resume lasix today  3. Hx of Chronic Afib: Cont coumadin. Trend INR. Resume diltiazem.    4. Hx of CVA:  On asa, resume statin. 5. S/p CABG:  On asa, statin.  Resume BB when appropriate. 6. Hx of Frequent UTIs: No symptoms currently, monitor  7. Hx of hypothyroid:  On synthroid. 8. HTN: resumed dilt, monitor  9. Dispo: PT/OT eval. CT's removed today, may need rehab vs HH  POD#3: 1. S/p Transapical TAVR: Stable. On asa. No Plavix (pt already on coumadin for a fib). 2. Atelectasis:  Wean oxygen for 02 sat > 90%. Increase IS and activity as tolerated. On lasix   3. Hx of Chronic Afib: Cont coumadin. Trend INR. On diltiazem. 4. Hx of CVA:  On asa, resume statin. 5. S/p CABG:  On asa, statin.  Resume BB when appropriate. 6. Hx of Frequent UTIs: No symptoms currently, monitor  7. Hx of hypothyroid:  On synthroid. 8. HTN: resumed dilt, monitor  9. Dispo: PT/OT. HH vs Rehab. Patient needs to ambulate. POD#4: 1. S/p Transapical TAVR: Stable. On asa. No Plavix (pt already on coumadin for a fib). 2. Atelectasis:  On RA. Increase IS and activity as tolerated. On lasix   3. Hx of Chronic Afib: Cont coumadin. Trend INR. On diltiazem. 4. Hx of CVA:  On asa, resume statin. 5. S/p CABG:  On asa, statin.  Resume BB when appropriate. 6. Hx of Frequent UTIs: No symptoms currently, monitor  7. Hx of hypothyroid:  On synthroid. 8. HTN: resumed dilt, monitor  9. Dispo: PT/OT. HH vs Rehab. Patient needs to ambulate. POD#5: 1. S/p Transapical TAVR: Stable. On asa. No Plavix (pt already on coumadin for a fib). 2. Atelectasis:  On RA. Increase IS and activity as tolerated. Cont lasix   3. Hx of Chronic Afib: Cont coumadin. Trend INR. On diltiazem. 4. Hx of CVA:  On asa, statin. 5. S/p CABG: On asa, statin.  Resume BB today  6. Hx of Frequent UTIs: No symptoms currently, monitor  7. Hx of hypothyroid:  On synthroid. 8. HTN: cont dilt, resume atenolol today  9. Dispo: PT/OT. Did better with therapy yesterday, if walks well today home with home health PT  Referral to outpatient cardiac rehab made.      Discharge Vital Signs: Visit Vitals    /50    Pulse 75    Temp 97.7 °F (36.5 °C)    Resp 18    Ht 5' 6\" (1.676 m)    Wt 171 lb 8.3 oz (77.8 kg)    SpO2 100%    Breastfeeding No    BMI 27.68 kg/m2       Labs:   Recent Labs      06/26/17   0408   WBC  5.2   HGB  10.2*   HCT  32.0*   PLT  118*   NA  138   K  3.7   BUN  16   CREA  0.79   GLU  114*   INR  1.5*       Diagnostics:   PA/lateral: Left basilar atelectasis and small left pleural effusion. No  Pneumothorax. Patient Instructions/Follow Up Care:  Discharge instructions were reviewed with the patient and family present. Questions were also answered at this time. Prescriptions and medications were reviewed. The patient has a follow up appointment with the Nurse Practitioner or Physician's Assistant on 6/28/17 at 10:00 am and with Dr. Lieutenant Bueno on 7/24/17 at 1:00 pm. The patient was also instructed to follow up with her primary care physician as needed. The patient and family were encouraged to call with any questions or concerns.        Signed:  Gary Maza NP  6/26/2017  3:23 PM

## 2017-06-26 NOTE — PROGRESS NOTES
Problem: Mobility Impaired (Adult and Pediatric)  Goal: *Acute Goals and Plan of Care (Insert Text)  Physical Therapy Goals  Initiated 6/22/2017  1. Patient will move from supine to sit and sit to supine, scoot up and down and roll side to side in bed with independence within 5 day(s). 2. Patient will transfer from bed to chair and chair to bed with modified independence using the least restrictive device within 5 day(s). 3. Patient will perform sit to stand with modified independence within 5 day(s). 4. Patient will ambulate with supervision/set-up for 150 feet with the least restrictive device within 5 day(s). 5. Patient will ascend/descend 1 stair with handrails per home setup with supervision/set-up within 5 day(s). 6. Patient will complete TUG assessment within 5 day(s). PHYSICAL THERAPY TREATMENT  Patient: Armida Foy (80 y.o. female)  Date: 6/26/2017  Diagnosis: AORTIC STENOSIS  Aortic stenosis <principal problem not specified>  Procedure(s) (LRB):  TRANSCATHETER AORTIC VALVE REPLACEMENT, TRANSAPICAL APPROACH, 23MM S3 VALVE (Left) 5 Days Post-Op  Precautions: Fall  Chart, physical therapy assessment, plan of care and goals were reviewed. ASSESSMENT:  Pt received supine in bed w/ HOB slightly elevated. Pt agreeable to PT although reporting she had just got back to bed. Pt CGA-SBA for bed mobility and transfers. Pt VSS and only reports weakness in LE's. Pt amb approx 175 FT around unit using RW, CGA. Recommend pt to use RW for home use for additional support/safety w/ amb at home and in community (tracee FULTON, pt reported LE weakness). Pt reported having only a threshold to step over to enter home and has not other additional stairs inside home. Pt also reported living w/ DTR who provides assistance and transportation for pt. Pt practiced (cleared) 2 steps using both handrails w/ CGA using step to step technique. Pt returned to bed to rest and encouraged pt to sit up in chair for meals.   Pt clear from PT standpoint but may benefit from additional night for additional session for gait as pt reported LE weakness from decreased activity over weekend. Progression toward goals:  [X]      Improving appropriately and progressing toward goals  [ ]      Improving slowly and progressing toward goals  [ ]      Not making progress toward goals and plan of care will be adjusted       PLAN:  Patient continues to benefit from skilled intervention to address the above impairments. Continue treatment per established plan of care. Discharge Recommendations:  Home Health  Further Equipment Recommendations for Discharge: Owns RW, SPC, W/C          SUBJECTIVE:   Patient stated I like to go for the crack.  referring to where [facility] elevates when returning to supine. OBJECTIVE DATA SUMMARY:   Critical Behavior:  Neurologic State: Alert, Appropriate for age  Orientation Level: Oriented X4  Cognition: Appropriate decision making, Appropriate for age attention/concentration, Appropriate safety awareness, Follows commands  Safety/Judgement: Awareness of environment, Good awareness of safety precautions (demonstrates 3/3 sternal precautions)  Functional Mobility Training:  Bed Mobility:     Supine to Sit: Stand-by asssistance  Sit to Supine: Stand-by asssistance                       Transfers:  Sit to Stand: Contact guard assistance;Assist x1  Stand to Sit: Stand-by asssistance                             Balance:  Sitting: Intact  Standing: Intact; With support (RW)  Ambulation/Gait Training:  Distance (ft): 175 Feet (ft)  Assistive Device: Gait belt;Walker, rolling  Ambulation - Level of Assistance: Contact guard assistance        Gait Abnormalities: Decreased step clearance (flexed posture, rounded shoulders)        Base of Support: Widened     Speed/Rosa: Pace decreased (<100 feet/min); Slow  Step Length: Left shortened;Right shortened                    Stairs:  Number of Stairs Trained: 2     Rail Use: Both Pain:  Pain Scale 1: Numeric (0 - 10)  Pain Intensity 1: 0              Activity Tolerance:   Good, minimal SOB, VSS. O2 100% (RA)  Please refer to the flowsheet for vital signs taken during this treatment.   After treatment:   [ ] Patient left in no apparent distress sitting up in chair  [X] Patient left in no apparent distress in bed  [X] Call bell left within reach  [X] Nursing notified  [ ] Caregiver present  [ ] Bed alarm activated      COMMUNICATION/COLLABORATION:   The patients plan of care was discussed with: Registered Nurse Taryn HortonPTA   Time Calculation: 24 mins

## 2017-06-26 NOTE — PROGRESS NOTES
0730 Bedside shift change report given to Megan Hay RN (oncoming nurse) by Marcus Zuñiga RN (offgoing nurse). Report included the following information SBAR, Kardex, Procedure Summary, Intake/Output, MAR, Recent Results and Cardiac Rhythm Afib, PVCs. 200 Pt ambulated in hallway with RN and her walker without difficultly. Fatou Yanez for home. [address] from Nurse    The following personal items are in your possession at time of discharge:    Dental Appliances: None  Visual Aid: Glasses, With patient                            PATIENT INSTRUCTIONS:    After general anesthesia or intravenous sedation, for 24 hours or while taking prescription Narcotics:  · Limit your activities  · Do not drive and operate hazardous machinery  · Do not make important personal or business decisions  · Do  not drink alcoholic beverages  · If you have not urinated within 8 hours after discharge, please contact your surgeon on call. Report the following to your surgeon:  · Excessive pain, swelling, redness or odor of or around the surgical area  · Temperature over 100.5  · Nausea and vomiting lasting longer than 4 hours or if unable to take medications  · Any signs of decreased circulation or nerve impairment to extremity: change in color, persistent  numbness, tingling, coldness or increase pain  · Any questions        What to do at Home:  Recommended activity: Activity as tolerated    If you experience any of the following symptoms see discharge instructions, please follow up with [facility]. *  Please give a list of your current medications to your Primary Care Provider. *  Please update this list whenever your medications are discontinued, doses are      changed, or new medications (including over-the-counter products) are added. *  Please carry medication information at all times in case of emergency situations.           These are general instructions for a healthy lifestyle:    No smoking/ No tobacco products/ Avoid exposure to second hand smoke    Surgeon General's Warning:  Quitting smoking now greatly reduces serious risk to your health. Obesity, smoking, and sedentary lifestyle greatly increases your risk for illness    A healthy diet, regular physical exercise & weight monitoring are important for maintaining a healthy lifestyle    You may be retaining fluid if you have a history of heart failure or if you experience any of the following symptoms:  Weight gain of 3 pounds or more overnight or 5 pounds in a week, increased swelling in our hands or feet or shortness of breath while lying flat in bed. Please call your doctor as soon as you notice any of these symptoms; do not wait until your next office visit. Recognize signs and symptoms of STROKE:    F-face looks uneven    A-arms unable to move or move unevenly    S-speech slurred or non-existent    T-time-call 911 as soon as signs and symptoms begin-DO NOT go       Back to bed or wait to see if you get better-TIME IS BRAIN. Warning Signs of HEART ATTACK     Call 911 if you have these symptoms:   Chest discomfort. Most heart attacks involve discomfort in the center of the chest that lasts more than a few minutes, or that goes away and comes back. It can feel like uncomfortable pressure, squeezing, fullness, or pain.  Discomfort in other areas of the upper body. Symptoms can include pain or discomfort in one or both arms, the back, neck, jaw, or stomach.  Shortness of breath with or without chest discomfort.  Other signs may include breaking out in a cold sweat, nausea, or lightheadedness. Don't wait more than five minutes to call [address]! Fast action can save your life. Calling 911 is almost always the fastest way to get lifesaving treatment. Emergency Medical Services staff can begin treatment when they arrive  up to an hour sooner than if someone gets to the hospital by car.        The discharge information has been reviewed with the patient and caregiver. The patient and caregiver verbalized understanding. Discharge medications reviewed with the patient and caregiver and appropriate educational materials and side effects teaching were provided. Problem: Cardiac Valve Surgery: Post-Op Day 5  Goal: *Hemodynamically stable  Outcome: Progressing Towards Goal  Patient Vitals for the past 12 hrs:    Temp Pulse Resp BP SpO2   06/26/17 0730 99.2 °F (37.3 °C) 80 18 123/57 98 %   06/26/17 0356 99.4 °F (37.4 °C) 82 18 147/47 96 %   06/25/17 2342 99.2 °F (37.3 °C) 91 18 163/62 93 %         Goal: *Stable cardiac rhythm  Outcome: Progressing Towards Goal  Pt is in Afib with occasional PVCs  Goal: *Lungs clear or at baseline  Outcome: Progressing Towards Goal  Lungs diminished in the bases bilaterally. Goal: *Incisions without signs and symptoms of wound complication  Outcome: Progressing Towards Goal  Incisions without s/sx of wound infection or complications. Problem: Pressure Injury - Risk of  Goal: *Prevention of pressure ulcer  Outcome: Progressing Towards Goal  No ulcer noted. 06/26/17 0845   Wound Prevention and Protection Methods   Orientation of Wound Prevention Posterior   Location of Wound Prevention Sacrum/Coccyx   Dressing Present  No   Read Only, Retired: Wound Treatment (non-mechanical)   Wound Offloading (Prevention Methods) Bed, pressure reduction mattress;Pillows;Repositioning         Problem: Falls - Risk of  Goal: *Absence of falls  Outcome: Progressing Towards Goal  Pt has remained free of falls since admission. Goal: *Knowledge of fall prevention  Outcome: Progressing Towards Goal  Pt demonstrates appropriate safety awareness.

## 2017-06-26 NOTE — PROGRESS NOTES
1930: Bedside and Verbal shift change report given to Salvador RN (oncoming nurse) by Raine Hagan RN (offgoing nurse). Report included the following information SBAR, Kardex, Intake/Output, MAR and Recent Results. 0630: Patient up to the chair and bathed with CHG. Gown and linen changed. 0730: Bedside and Verbal shift change report given to Jarocho Veras RN (oncoming nurse) by Tiffanie Abreu RN (offgoing nurse). Report included the following information SBAR, Kardex, Intake/Output, MAR and Recent Results.

## 2017-06-26 NOTE — PROGRESS NOTES
CALVIN spoke with [facility] [phone] regarding possible plan for patient to go home today depending upon how patient does with PT - they have received HH SN orders- CM faxed additional orders for [place] PT/OT and stat PT/INR draw for tomorrow to [phone]-will await discharge orders at this time. JEIMY Laws CM made [facility] aware of discharge to home today faxed copy of AVS to [phone].  Dexter Whatley MSW

## 2017-06-26 NOTE — PROGRESS NOTES
Problem: Cardiac Valve Surgery: Post-Op Day 4  Goal: *Hemodynamically stable  Outcome: Progressing Towards Goal  Visit Vitals    /46 (BP 1 Location: Right arm, BP Patient Position: At rest)    Pulse 92    Temp 99 °F (37.2 °C)    Resp 18    Ht 5' 6\" (1.676 m)    Wt 78 kg (171 lb 15.3 oz)    SpO2 98%    Breastfeeding No    BMI 27.75 kg/m2           Problem: Pressure Injury - Risk of  Goal: *Prevention of pressure ulcer  Outcome: Progressing Towards Goal  Patient free of signs and symptoms of pressure ulcers at this time. Pressure ulcer prevention performed. Will continue to monitor. Problem: Falls - Risk of  Goal: *Absence of falls  Outcome: Progressing Towards Goal  Patient is free of falls at this time. Fall prevention performed and patient instructed to call for assistance using call bell as needed. Will continue to monitor. Goal: *Knowledge of fall prevention  Outcome: Progressing Towards Goal  Patient is free of falls at this time. Fall prevention performed and patient instructed to call for assistance using call bell as needed. Will continue to monitor.

## 2017-06-27 ENCOUNTER — TELEPHONE (OUTPATIENT)
Dept: CASE MANAGEMENT | Age: 82
End: 2017-06-27

## 2017-06-27 ENCOUNTER — TELEPHONE ANTICOAG (OUTPATIENT)
Dept: CARDIOTHORACIC SURGERY | Age: 82
End: 2017-06-27

## 2017-06-27 LAB — INR, EXTERNAL: 1.4

## 2017-06-28 ENCOUNTER — TELEPHONE ANTICOAG (OUTPATIENT)
Dept: CARDIOTHORACIC SURGERY | Age: 82
End: 2017-06-28

## 2017-06-28 LAB — INR, EXTERNAL: 1.5

## 2017-06-30 ENCOUNTER — TELEPHONE ANTICOAG (OUTPATIENT)
Dept: CARDIOTHORACIC SURGERY | Age: 82
End: 2017-06-30

## 2017-06-30 ENCOUNTER — OFFICE VISIT (OUTPATIENT)
Dept: CARDIOTHORACIC SURGERY | Age: 82
End: 2017-06-30

## 2017-06-30 ENCOUNTER — TELEPHONE (OUTPATIENT)
Dept: CARDIAC REHAB | Age: 82
End: 2017-06-30

## 2017-06-30 VITALS
DIASTOLIC BLOOD PRESSURE: 66 MMHG | SYSTOLIC BLOOD PRESSURE: 116 MMHG | BODY MASS INDEX: 27.17 KG/M2 | HEART RATE: 67 BPM | WEIGHT: 168.31 LBS | TEMPERATURE: 98 F | RESPIRATION RATE: 18 BRPM | OXYGEN SATURATION: 95 %

## 2017-06-30 DIAGNOSIS — I10 HYPERTENSION, BENIGN: ICD-10-CM

## 2017-06-30 DIAGNOSIS — I25.10 CORONARY ARTERY DISEASE INVOLVING NATIVE HEART WITHOUT ANGINA PECTORIS, UNSPECIFIED VESSEL OR LESION TYPE: ICD-10-CM

## 2017-06-30 DIAGNOSIS — I35.0 AORTIC VALVE STENOSIS, UNSPECIFIED ETIOLOGY: Primary | ICD-10-CM

## 2017-06-30 DIAGNOSIS — Z95.1 S/P CABG (CORONARY ARTERY BYPASS GRAFT): ICD-10-CM

## 2017-06-30 DIAGNOSIS — Z95.2 S/P TAVR (TRANSCATHETER AORTIC VALVE REPLACEMENT): ICD-10-CM

## 2017-06-30 LAB
INR PPP: 1.3 (ref 0.8–1.2)
PROTHROMBIN TIME: 13.9 SEC (ref 9.1–12)

## 2017-06-30 NOTE — MR AVS SNAPSHOT
Visit Information Date & Time Provider Department Dept. Phone Encounter #  
 6/30/2017 10:00 AM Enrique Hull NP [facility] [phone] [id number] Your Appointments 7/24/2017 12:30 PM  
POST OP with Enrique Hull NP [facility] - [facility] ([address] [place] [address]) Appt Note: Post op 1 month [facility] per Rowena Connell; 1 month post op; echo prior [address] [address] [facility]  
[phone]  
  
   
 [address] [phone]  
  
    
 7/24/2017  1:00 PM  
POST OP with Padmini Talbot MD  
[facility] - [facility] ([address] [place] [address]) Appt Note: 1 month post op [address] [address] [facility]  
[address] [facility] 7 [phone] Upcoming Health Maintenance Date Due DTaP/Tdap/Td series (1 - Tdap) 12/1/1953 ZOSTER VACCINE AGE 60> 12/1/1992 GLAUCOMA SCREENING Q2Y 12/1/1997 OSTEOPOROSIS SCREENING (DEXA) 12/1/1997 MEDICARE YEARLY EXAM 12/1/1997 Pneumococcal 65+ Low/Medium Risk (2 of 2 - PPSV23) 10/19/2010 INFLUENZA AGE 9 TO ADULT 8/1/2017 Allergies as of 6/30/2017  Review Complete On: 6/30/2017 By: Enrique Hull NP Severity Noted Reaction Type Reactions Levofloxacin High 08/19/2011    Swelling Codeine  08/19/2011    Unable to Obtain  
 makes patient cry Contrast Agent [Iodine]  08/19/2011    Unknown (comments) Dr. Les Oquendo told patient that she is allergic Current Immunizations  Reviewed on 8/22/2011 Name Date Pneumococcal Vaccine (Unspecified Type) 10/19/2005 Not reviewed this visit You Were Diagnosed With   
  
 Codes Comments Aortic valve stenosis, unspecified etiology    -  Primary ICD-10-CM: I35.0 ICD-9-CM: 424.1 S/P TAVR (transcatheter aortic valve replacement)     ICD-10-CM: V89.1 ICD-9-CM: V43.3 Coronary artery disease involving native heart without angina pectoris, unspecified vessel or lesion type     ICD-10-CM: I25.10 ICD-9-CM: 414.01 S/P CABG (coronary artery bypass graft)     ICD-10-CM: Z95.1 ICD-9-CM: V45.81 Hypertension, benign     ICD-10-CM: I10 
ICD-9-CM: 401.1 Vitals BP Pulse Temp Resp Weight(growth percentile) SpO2  
 116/66 (BP 1 Location: Right arm, BP Patient Position: Sitting) 67 98 °F (36.7 °C) (Oral) 18 168 lb 5 oz (76.3 kg) 95% BMI Smoking Status 27.17 kg/m2 Never Smoker BMI and BSA Data Body Mass Index Body Surface Area  
 27.17 kg/m 2 1.88 m 2 Preferred Pharmacy Pharmacy Name [facility] [address] [place] [address] [phone] Your Updated Medication List  
  
   
This list is accurate as of: 6/30/17 11:23 AM.  Always use your most recent med list.  
  
  
  
  
 aspirin delayed-release 81 mg tablet Take 81 mg by mouth daily. atenolol 25 mg tablet Commonly known as:  TENORMIN Take 25 mg by mouth daily. Biotin 2,500 mcg Cap Take 1 Cap by mouth nightly. cholecalciferol 1,000 unit Cap Commonly known as:  VITAMIN D3 Take 1,000 Units by mouth nightly. CRESTOR 10 mg tablet Generic drug:  rosuvastatin Take 40 mg by mouth nightly. cyanocobalamin 500 mcg tablet Commonly known as:  VITAMIN B12 Take 500 mcg by mouth nightly. dilTIAZem  mg Tb24 tablet Commonly known as:  CARDIZEM LA Take 180 mg by mouth nightly. furosemide 20 mg tablet Commonly known as:  LASIX Take  by mouth daily. levothyroxine 125 mcg tablet Commonly known as:  SYNTHROID Take  by mouth Daily (before breakfast). nitroglycerin 0.4 mg SL tablet Commonly known as:  NITROSTAT  
1 Tab by SubLINGual route as needed for Chest Pain.  
  
 potassium chloride SR 10 mEq tablet Commonly known as:  KLOR-CON 10  
 Take 20 mEq by mouth daily. senna-docusate 8.6-50 mg per tablet Commonly known as:  [place]a [place] Take 1 Tab by mouth two (2) times daily as needed for Constipation for up to 60 days. sertraline 50 mg tablet Commonly known as:  ZOLOFT  
TAKE ONE TABLET BY MOUTH EVERY DAY  
  
 traMADol 50 mg tablet Commonly known as:  ULTRAM  
Take 1 Tab by mouth every six (6) hours as needed. Max Daily Amount: 200 mg.  
  
 TYLENOL 325 mg tablet Generic drug:  acetaminophen Take 650 mg by mouth every four (4) hours as needed for Pain.  
  
 warfarin 7.5 mg tablet Commonly known as:  COUMADIN Take 7.5 mg by mouth daily. As directed We Performed the Following PROTHROMBIN TIME + INR [84003 CPT(R)] To-Do List   
 07/24/2017 ECHO:  2D ECHO COMPLETE ADULT (TTE) W OR WO CONTR Introducing [facility]! Justa Sheridan introduces Blooie patient portal. Now you can access parts of your medical record, email your doctor's office, and request medication refills online. 1. In your internet browser, go to [email or web address] 2. Click on the First Time User? Click Here link in the Sign In box. You will see the New Member Sign Up page. 3. Enter your Blooie Access Code exactly as it appears below. You will not need to use this code after youve completed the sign-up process. If you do not sign up before the expiration date, you must request a new code. · Blooie Access Code: [email or web address] Expires: 8/8/2017  3:29 PM 
 
4. Enter the last four digits of your Social Security Number (xxxx) and Date of Birth (mm/dd/yyyy) as indicated and click Submit. You will be taken to the next sign-up page. 5. Create a Blooie ID. This will be your Blooie login ID and cannot be changed, so think of one that is secure and easy to remember. 6. Create a Blooie password. You can change your password at any time. 7. Enter your Password Reset Question and Answer. This can be used at a later time if you forget your password. 8. Enter your e-mail address. You will receive e-mail notification when new information is available in [address] 9. Click Sign Up. You can now view and download portions of your medical record. 10. Click the Download Summary menu link to download a portable copy of your medical information. If you have questions, please visit the Frequently Asked Questions section of the Spor website. Remember, Spor is NOT to be used for urgent needs. For medical emergencies, dial 911. Now available from your iPhone and Android! Please provide this summary of care documentation to your next provider. Your primary care clinician is listed as Negro Pena. If you have any questions after today's visit, please call [phone].

## 2017-06-30 NOTE — PROGRESS NOTES
Patient: Morris Ascencio   Age: 80 y.o. Patient Care Team:  Ellen Chowdhury MD as PCP - General (Internal Medicine)  Mansoor Henning MD as Referring Provider (Internal Medicine)  Rain Garces MD as Surgeon (Cardiothoracic Surgery)      PCP: Ellen Chowdhury MD    Cardiologist: Kimi Monae    Diagnosis/Reason for Consultation: The primary encounter diagnosis was Aortic valve stenosis, unspecified etiology. Diagnoses of S/P TAVR (transcatheter aortic valve replacement), Coronary artery disease involving native heart without angina pectoris, unspecified vessel or lesion type, S/P CABG (coronary artery bypass graft), and Hypertension, benign were also pertinent to this visit. Problem List:   Patient Active Problem List   Diagnosis Code    Acute MI, subendocardial, initial episode of care ([facility] [address] I21.4    Unstable angina ([facility] [address] I20.0    ASHD (arteriosclerotic heart disease) I25.10    Hypertension, benign I10    ASO (arteriosclerosis obliterans) I70.90    Hypercholesteremia E78.00    Hypothyroidism E03.9    Nonrheumatic aortic valve stenosis I35.0    Aortic stenosis I35.0    S/P TAVR (transcatheter aortic valve replacement) Z95.2         HPI: 80 y.o.  female POD#  S/p TA TAVR (23 mm S3 aortic valve) for severe and symptomatic AS on 6/21/2017. Post-op course slow d/t pre-op deconditioning. Discharged home with services on POD#5. She is here today for her initial post-op f/u. Ms. Drake Fish states she's been doing pretty well. She has [place] RN/PT/OT coming to the home for services. Apparently yesterday she walked outside with PT and was able to withstand > 30 min of therapy. She states she still has some SOB/HARRISON with activity but it is markedly less than pre-op. She has some mild lightheadedness when she stands from a seated position on occasion but denies syncope or falls. She also denies CP, palpitations, and PND.   Her LE is improved but has consistent R > L ankle swelling d/t historic ankle surgery. She still has orthopnea but has reduced from three pillows to three. Her appetite is good and she is having regular BMs. She has mild pain at her thoracotomy site with activity but denies any infectious concerns. PMHx of AS, rheumatic fever, MI (2012), HTN, HLD, PVD, Afib (warfarin), CAD s/p CABG (1994 by Dr. Reyna Hernández) s/p multiple PCI multiple (MARIE to SVG-L Cx 8/2011, MARIE to SVG-Diag graft 10/2014), Carotid stensios s/p R CEA (1996), PAD with intermittent claudication, CVA s/p cerebellar infarcts, Hypothyroid, Nephrolithiasis, recurrent UTIs, past tobacco abuse (smoked for 2 yrs and quit 40 yrs ago)     Past Medical History:   Diagnosis Date    CAD (coronary artery disease)     mi    Heart failure ([facility] [address]     Hypertension     Thyroid disease     hypo       Past Surgical History:   Procedure Laterality Date    ABDOMEN SURGERY PROC UNLISTED      kidney stone    CARDIAC SURG PROCEDURE UNLIST      cabg    VASCULAR SURGERY PROCEDURE UNLIST      carotiderecctomy      Social History   Substance Use Topics    Smoking status: Never Smoker    Smokeless tobacco: Never Used    Alcohol use No      No family history on file. Prior to Admission medications    Medication Sig Start Date End Date Taking? Authorizing Provider   senna-docusate (PERICOLACE) 8.6-50 mg per tablet Take 1 Tab by mouth two (2) times daily as needed for Constipation for up to 60 days. 6/26/17 8/25/17 Yes Naomaugie Peña NP   traMADol [place]) 50 mg tablet Take 1 Tab by mouth every six (6) hours as needed. Max Daily Amount: 200 mg. 6/26/17  Yes Christopher Peña NP   Biotin 2,500 mcg cap Take 1 Cap by mouth nightly. Yes Historical Provider   cholecalciferol (VITAMIN D3) 1,000 unit cap Take 1,000 Units by mouth nightly. Yes Historical Provider   cyanocobalamin (VITAMIN B12) 500 mcg tablet Take 500 mcg by mouth nightly. Yes Historical Provider   dilTIAZem ER (CARDIZEM LA) 180 mg Tb24 tablet Take 180 mg by mouth nightly. Yes Historical Provider   potassium chloride SR (KLOR-CON 10) 10 mEq tablet Take 20 mEq by mouth daily. Yes Historical Provider   acetaminophen (TYLENOL) 325 mg tablet Take 650 mg by mouth every four (4) hours as needed for Pain. Yes Historical Provider   furosemide (LASIX) 20 mg tablet Take  by mouth daily. Yes Historical Provider   levothyroxine (SYNTHROID) 125 mcg tablet Take  by mouth Daily (before breakfast). Yes Historical Provider   rosuvastatin (CRESTOR) 10 mg tablet Take 40 mg by mouth nightly. Yes Historical Provider   warfarin (COUMADIN) 7.5 mg tablet Take 7.5 mg by mouth daily. As directed   Yes Historical Provider   sertraline (ZOLOFT) 50 mg tablet TAKE ONE TABLET BY MOUTH EVERY DAY 12/6/12  Yes Vivek Almanzar MD   nitroglycerin (NITROSTAT) 0.4 mg SL tablet 1 Tab by SubLINGual route as needed for Chest Pain. 4/10/12  Yes Ifrah Patiño MD   aspirin delayed-release 81 mg tablet Take 81 mg by mouth daily. Yes Historical Provider   atenolol (TENORMIN) 25 mg tablet Take 25 mg by mouth daily. Yes Historical Provider       Allergies   Allergen Reactions    Levofloxacin Swelling    Codeine Unable to Obtain     makes patient cry    Contrast Agent [Iodine] Unknown (comments)     Dr. Liza Edmond told patient that she is allergic       Current Medications:   Current Outpatient Prescriptions   Medication Sig Dispense Refill    senna-docusate (PERICOLACE) 8.6-50 mg per tablet Take 1 Tab by mouth two (2) times daily as needed for Constipation for up to 60 days. 60 Tab 1    traMADol (ULTRAM) 50 mg tablet Take 1 Tab by mouth every six (6) hours as needed. Max Daily Amount: 200 mg. 30 Tab 0    Biotin 2,500 mcg cap Take 1 Cap by mouth nightly.  cholecalciferol (VITAMIN D3) 1,000 unit cap Take 1,000 Units by mouth nightly.  cyanocobalamin (VITAMIN B12) 500 mcg tablet Take 500 mcg by mouth nightly.  dilTIAZem ER (CARDIZEM LA) 180 mg Tb24 tablet Take 180 mg by mouth nightly.       potassium chloride SR (KLOR-CON 10) 10 mEq tablet Take 20 mEq by mouth daily.  acetaminophen (TYLENOL) 325 mg tablet Take 650 mg by mouth every four (4) hours as needed for Pain.  furosemide (LASIX) 20 mg tablet Take  by mouth daily.  levothyroxine (SYNTHROID) 125 mcg tablet Take  by mouth Daily (before breakfast).  rosuvastatin (CRESTOR) 10 mg tablet Take 40 mg by mouth nightly.  warfarin (COUMADIN) 7.5 mg tablet Take 7.5 mg by mouth daily. As directed      sertraline (ZOLOFT) 50 mg tablet TAKE ONE TABLET BY MOUTH EVERY DAY 90 Tab 1    nitroglycerin (NITROSTAT) 0.4 mg SL tablet 1 Tab by SubLINGual route as needed for Chest Pain. 1 Bottle prn    aspirin delayed-release 81 mg tablet Take 81 mg by mouth daily.  atenolol (TENORMIN) 25 mg tablet Take 25 mg by mouth daily. Vitals: Blood pressure 116/66, pulse 67, temperature 98 °F (36.7 °C), temperature source Oral, resp. rate 18, weight 168 lb 5 oz (76.3 kg), SpO2 95 %. Allergies: is allergic to levofloxacin; codeine; and contrast agent [iodine].     Review of Systems: Pertinent Positives per HPI   [x]Total of 13 systems reviewed as follows:  Constitutional: Negative fever, negative chills  Eyes:   Negative for amauroses fugax  ENT:   Negative sore throat,oral absecess  Endocrine Negative for goiter; DM  Respiratory:  Negative chronic cough,sputum production  Cards:   Negative for palpitations, varicosities  GI:   Negative for dysphagia, bleeding, nausea, vomiting, diarrhea, and abdominal pain  Genitourinary: Negative for frequency, dysuria  Integument:  Negative for rash and pruritus  Hematologic:  Negative for easy bruising; bleeding dyscarsia  Musculoskel: Negative for muscle weakness inhibiting ambulation  Neurological:  Negative for stroke, TIA, syncope  Behavl/Psych: Negative for feelings of anxiety, depression     Physical Exam:  General: Well nourished well groomed elderly woman appearing stated age accompanied by daughter  Neuro: A&OX3. LOPEZ. PERRL. [place]. Slow steady assisted gait with rollator  Head:Normocephalic. Atraumatic. Symmetrical  Neck: Trachea Midline  Resp: CTA B. No Adv BS/cough/sputum/tachypnea with seated conversation  CV: S1S2 Irregular. RUFUS IV/VI. No JVD/carotid bruits. Pink/warm/dry extremities. Moderate LE peripheral edema  GI:Benign ab. Soft. NT/ND. Active BS  : Voids  Integ: R thoracotomy incision open to air with steri-strips. No s/s of infection. Mod ecchymosis. Chest tube site also open to air w/o s/s of infection. Appropriate tenderness. No drainage. Musculo/Skeletal: FROM in all major joints. Good muscle tone    Assessment/Plan:   1.  AS s/p TAVR - ASA 81mg only needed for valve patency as on warfarin for Afib. Encouraged to continue to increase activity daily. Has red reminder bracelet to call if concerns. Discussed cardiac wellness at [facility], which is apparently 6 min from their home, and both pt and daughter are very interested once [facility] services concluded. Has 30 day f/u appt and TTE scheduled for 7/24/2017 per TVT Registry Guidelines. To f/u with primary cardiologist in 6-8 weeks. To RTC at one yr anniversary for eval and TTE per TVT Registry Guidelines. 2. Afib - CCB/BB/warfarin. To get INR today for warfarin adjustment. 3. CAD s/p CABG and multiple stents - ASA, statin, BB per cards  4. PVD - multiple stents. ASA/statin per cards  5.  HTN - BB/CCB/Loop diuretic

## 2017-06-30 NOTE — TELEPHONE ENCOUNTER
6/30/2017 Cardiac Wellness: Called Ms. Lucero  to discuss participation in the Cardiac Wellness Program following TAVR on June 21, 2017. Patients states home health/rehab is going well and she will start cardiac rehab after she is released from them.  Brandon Flannery

## 2017-07-03 ENCOUNTER — TELEPHONE ANTICOAG (OUTPATIENT)
Dept: CARDIOTHORACIC SURGERY | Age: 82
End: 2017-07-03

## 2017-07-03 LAB — INR, EXTERNAL: 2.9

## 2017-07-04 LAB — INR, EXTERNAL: 3.1

## 2017-07-06 ENCOUNTER — TELEPHONE ANTICOAG (OUTPATIENT)
Dept: CARDIOTHORACIC SURGERY | Age: 82
End: 2017-07-06

## 2017-07-06 LAB — INR, EXTERNAL: 2.4

## 2017-07-10 ENCOUNTER — TELEPHONE ANTICOAG (OUTPATIENT)
Dept: CARDIOTHORACIC SURGERY | Age: 82
End: 2017-07-10

## 2017-07-10 LAB — INR, EXTERNAL: 2.6

## 2017-07-13 ENCOUNTER — TELEPHONE ANTICOAG (OUTPATIENT)
Dept: CARDIOTHORACIC SURGERY | Age: 82
End: 2017-07-13

## 2017-07-13 LAB — INR, EXTERNAL: 2.1

## 2017-07-17 ENCOUNTER — TELEPHONE ANTICOAG (OUTPATIENT)
Dept: CARDIOTHORACIC SURGERY | Age: 82
End: 2017-07-17

## 2017-07-17 LAB — INR, EXTERNAL: 2.4

## 2017-07-21 ENCOUNTER — TELEPHONE ANTICOAG (OUTPATIENT)
Dept: CARDIOTHORACIC SURGERY | Age: 82
End: 2017-07-21

## 2017-07-21 LAB — INR, EXTERNAL: 2.8

## 2017-07-24 ENCOUNTER — TELEPHONE ANTICOAG (OUTPATIENT)
Dept: CARDIOTHORACIC SURGERY | Age: 82
End: 2017-07-24

## 2017-07-24 ENCOUNTER — HOSPITAL ENCOUNTER (OUTPATIENT)
Dept: NON INVASIVE DIAGNOSTICS | Age: 82
Discharge: HOME | End: 2017-07-24
Attending: NURSE PRACTITIONER
Payer: MEDICARE

## 2017-07-24 ENCOUNTER — OFFICE VISIT (OUTPATIENT)
Dept: CARDIOTHORACIC SURGERY | Age: 82
End: 2017-07-24

## 2017-07-24 VITALS
BODY MASS INDEX: 26.76 KG/M2 | SYSTOLIC BLOOD PRESSURE: 118 MMHG | TEMPERATURE: 98.1 F | HEART RATE: 64 BPM | RESPIRATION RATE: 16 BRPM | DIASTOLIC BLOOD PRESSURE: 56 MMHG | OXYGEN SATURATION: 98 % | HEIGHT: 66 IN | WEIGHT: 166.5 LBS

## 2017-07-24 DIAGNOSIS — Z95.2 S/P TAVR (TRANSCATHETER AORTIC VALVE REPLACEMENT): ICD-10-CM

## 2017-07-24 DIAGNOSIS — I10 HYPERTENSION, BENIGN: ICD-10-CM

## 2017-07-24 DIAGNOSIS — I35.0 AORTIC VALVE STENOSIS, UNSPECIFIED ETIOLOGY: Primary | ICD-10-CM

## 2017-07-24 DIAGNOSIS — I25.10 CORONARY ARTERY DISEASE INVOLVING NATIVE HEART WITHOUT ANGINA PECTORIS, UNSPECIFIED VESSEL OR LESION TYPE: ICD-10-CM

## 2017-07-24 DIAGNOSIS — Z95.1 S/P CABG (CORONARY ARTERY BYPASS GRAFT): ICD-10-CM

## 2017-07-24 DIAGNOSIS — E03.9 HYPOTHYROIDISM, UNSPECIFIED TYPE: ICD-10-CM

## 2017-07-24 DIAGNOSIS — I35.0 AORTIC VALVE STENOSIS, UNSPECIFIED ETIOLOGY: ICD-10-CM

## 2017-07-24 PROCEDURE — 93306 TTE W/DOPPLER COMPLETE: CPT

## 2017-07-24 RX ORDER — CEPHALEXIN 500 MG/1
500 CAPSULE ORAL 3 TIMES DAILY
COMMUNITY

## 2017-07-24 NOTE — PROGRESS NOTES
Patient: Diana Roche   Age: 80 y.o. Patient Care Team:  Warren San MD as PCP - General (Internal Medicine)  Pro tSauffer MD as Referring Provider (Internal Medicine)  Eusebia Galvan MD as Surgeon (Cardiothoracic Surgery)      PCP: Warren San MD    Cardiologist: Cassius Bond    Diagnosis/Reason for Consultation: The primary encounter diagnosis was Aortic valve stenosis, unspecified etiology. Diagnoses of S/P TAVR (transcatheter aortic valve replacement), Coronary artery disease involving native heart without angina pectoris, unspecified vessel or lesion type, S/P CABG (coronary artery bypass graft), Hypertension, benign, and Hypothyroidism, unspecified type were also pertinent to this visit. Problem List:   Patient Active Problem List   Diagnosis Code    Acute MI, subendocardial, initial episode of care ([facility] [address] I21.4    Unstable angina ([facility] [address] I20.0    ASHD (arteriosclerotic heart disease) I25.10    Hypertension, benign I10    ASO (arteriosclerosis obliterans) I70.90    Hypercholesteremia E78.00    Hypothyroidism E03.9    Nonrheumatic aortic valve stenosis I35.0    Aortic stenosis I35.0    S/P TAVR (transcatheter aortic valve replacement) Z95.2      HPI: 80 y.o.  female  s/p TA TAVR (23 mm S3 aortic valve) for severe and symptomatic AS on 6/21/2017. Post-op hospital course slow d/t pre-op deconditioning. Discharged home with services on POD#5. She is here today for her 30 day post-op f/u.      Ms. Marquez states she's been progressing really well. Both she and her daughter are pleased with her progress. She has still has [facility] RN/PT/OT coming to the home for services but states that is to conclude after next couple of visits. She is very interested in attending cardiac wellness at [facility]. She states she still doesn't have the stamina she wants and is a little slow going but can participate with PT >  30 min at a time.   She states she may still have some mild SOB/HARRISON on occasion but this is markedly improved from pre-op and it is really her leg strength and claudication that limits her activity tolerance as well as her balance issues. She states her balance and dizziness and been a problem since before her TAVR and now that she is less winded, she hopes to work on that as well. She wonders if she has an inner ear issue. She denies any CP, palpitations, LE edema, PND, syncope or falls and has improved orthopnea (down to 2 from 3 pillows). She has not had any hospitalizations since discharge, is independent in her ADLs, medical decision making. She still lives with her daughter that is involved in her care. PMHx of AS, rheumatic fever, MI (2012), HTN, HLD, PVD, Afib (warfarin), CAD s/p CABG (1994 by Dr. Marcelina Manriquez) s/p multiple PCI multiple (MARIE to SVG-L Cx 8/2011, MARIE to SVG-Diag graft 10/2014), Carotid stensios s/p R CEA (1996), PAD with intermittent claudication, CVA s/p cerebellar infarcts, Hypothyroid, Nephrolithiasis, recurrent UTIs, past tobacco abuse (smoked for 2 yrs and quit 40 yrs ago)    NYHA Classification: II   Class II (Mild): Slight limitation of physical activity. Comfortable at rest, but ordinary physical activity results in fatigue, palpitation, or dyspnea. Past Medical History:   Diagnosis Date    CAD (coronary artery disease)     mi    Heart failure ([address]     Hypertension     Thyroid disease     hypo       Past Surgical History:   Procedure Laterality Date    ABDOMEN SURGERY PROC UNLISTED      kidney stone    CARDIAC SURG PROCEDURE UNLIST      cabg    VASCULAR SURGERY PROCEDURE UNLIST      carotiderecctomy      Social History   Substance Use Topics    Smoking status: Never Smoker    Smokeless tobacco: Never Used    Alcohol use No      No family history on file. Prior to Admission medications    Medication Sig Start Date End Date Taking?  Authorizing Provider   cephALEXin (KEFLEX) 500 mg capsule Take 500 mg by mouth three (3) times daily. Yes Historical Provider   traMADol (ULTRAM) 50 mg tablet Take 1 Tab by mouth every six (6) hours as needed. Max Daily Amount: 200 mg. 6/26/17  Yes Brown Nava NP   Biotin 2,500 mcg cap Take 1 Cap by mouth nightly. Yes Historical Provider   cholecalciferol (VITAMIN D3) 1,000 unit cap Take 1,000 Units by mouth nightly. Yes Historical Provider   cyanocobalamin (VITAMIN B12) 500 mcg tablet Take 500 mcg by mouth nightly. Yes Historical Provider   dilTIAZem ER (CARDIZEM LA) 180 mg Tb24 tablet Take 180 mg by mouth nightly. Yes Historical Provider   potassium chloride SR (KLOR-CON 10) 10 mEq tablet Take 20 mEq by mouth daily. Yes Historical Provider   acetaminophen (TYLENOL) 325 mg tablet Take 650 mg by mouth every four (4) hours as needed for Pain. Yes Historical Provider   furosemide (LASIX) 20 mg tablet Take  by mouth daily. Yes Historical Provider   levothyroxine (SYNTHROID) 125 mcg tablet Take  by mouth Daily (before breakfast). Yes Historical Provider   rosuvastatin (CRESTOR) 10 mg tablet Take 40 mg by mouth nightly. Yes Historical Provider   warfarin (COUMADIN) 7.5 mg tablet Take 7.5 mg by mouth daily. As directed   Yes Historical Provider   sertraline (ZOLOFT) 50 mg tablet TAKE ONE TABLET BY MOUTH EVERY DAY 12/6/12  Yes Luz Breen MD   nitroglycerin (NITROSTAT) 0.4 mg SL tablet 1 Tab by SubLINGual route as needed for Chest Pain. 4/10/12  Yes Rashaun Triplett MD   aspirin delayed-release 81 mg tablet Take 81 mg by mouth daily. Yes Historical Provider   atenolol (TENORMIN) 25 mg tablet Take 25 mg by mouth daily.    Yes Historical Provider       Allergies   Allergen Reactions    Levofloxacin Swelling    Codeine Unable to Obtain     makes patient cry    Contrast Agent [Iodine] Unknown (comments)     Dr. Mitzi Hall told patient that she is allergic       Current Medications:   Current Outpatient Prescriptions   Medication Sig Dispense Refill    cephALEXin (KEFLEX) 500 mg capsule Take 500 mg by mouth three (3) times daily.  traMADol (ULTRAM) 50 mg tablet Take 1 Tab by mouth every six (6) hours as needed. Max Daily Amount: 200 mg. 30 Tab 0    Biotin 2,500 mcg cap Take 1 Cap by mouth nightly.  cholecalciferol (VITAMIN D3) 1,000 unit cap Take 1,000 Units by mouth nightly.  cyanocobalamin (VITAMIN B12) 500 mcg tablet Take 500 mcg by mouth nightly.  dilTIAZem ER (CARDIZEM LA) 180 mg Tb24 tablet Take 180 mg by mouth nightly.  potassium chloride SR (KLOR-CON 10) 10 mEq tablet Take 20 mEq by mouth daily.  acetaminophen (TYLENOL) 325 mg tablet Take 650 mg by mouth every four (4) hours as needed for Pain.  furosemide (LASIX) 20 mg tablet Take  by mouth daily.  levothyroxine (SYNTHROID) 125 mcg tablet Take  by mouth Daily (before breakfast).  rosuvastatin (CRESTOR) 10 mg tablet Take 40 mg by mouth nightly.  warfarin (COUMADIN) 7.5 mg tablet Take 7.5 mg by mouth daily. As directed      sertraline (ZOLOFT) 50 mg tablet TAKE ONE TABLET BY MOUTH EVERY DAY 90 Tab 1    nitroglycerin (NITROSTAT) 0.4 mg SL tablet 1 Tab by SubLINGual route as needed for Chest Pain. 1 Bottle prn    aspirin delayed-release 81 mg tablet Take 81 mg by mouth daily.  atenolol (TENORMIN) 25 mg tablet Take 25 mg by mouth daily. Vitals: Blood pressure 118/56, pulse 64, temperature 98.1 °F (36.7 °C), temperature source Oral, resp. rate 16, height 5' 6\" (1.676 m), weight 166 lb 8 oz (75.5 kg), SpO2 98 %. Allergies: is allergic to levofloxacin; codeine; and contrast agent [iodine].     Review of Systems: Pertinent Positives per HPI   [x]Total of 13 systems reviewed as follows:  Constitutional: Negative fever, negative chills  Eyes:  Negative for amauroses fugax  ENT: Negative sore throat,oral absecess  Endocrine   Negative for goiter; DM  Respiratory:  Negative chronic cough,sputum production  Cards: Negative for palpitations, varicosities  GI:Negative for dysphagia, bleeding, nausea, vomiting, diarrhea, and abdominal pain  Genitourinary: Negative for frequency, dysuria  Integument: Negative for rash and pruritus  Hematologic: Negative for easy bruising; bleeding dyscarsia  Musculoskel: Negative for muscle weakness inhibiting ambulation  Neurological:   Negative for stroke, TIA, syncope  Behavl/Psych: Negative for feelings of anxiety, depression     Cardiovascular Testing:   EKG today: Afib 53 bpm. LAFB (new)    TTE today:  Read not available yet    Physical Exam:  General: Well nourished well groomed elderly woman appearing stated age accompanied by daughter  Neuro: A&OX3. LOPEZ. PERRL. [place]. Slow steady assisted gait with rollator  Head:Normocephalic. Atraumatic. Symmetrical  Neck: Trachea Midline  Resp: CTA B. No Adv BS/cough/sputum/tachypnea with seated conversation  CV: S1S2 Irregular. No M/R/G. No JVD/carotid bruits. Pink/warm/dry extremities. Mild LE peripheral edema  GI:Benign ab. Soft. NT/ND. Active BS  : Voids  Integ: R thoracotomy incision well healed. osis. Chest tube site also open to air w/o s/s of infection. Appropriate tenderness. No drainage. Musculo/Skeletal: FROM in all major joints. Good muscle tone    Clinic Evaluation:   KCCQ-12: scanned into EMR     5 meter gait: 8.49 seconds    Assessment/Plan:   1.  AS s/p TAVR - ASA 81mg only needed for valve patency as on warfarin for Afib. Encouraged to continue to increase activity daily cumulatively - will start cardiac wellness soon as [facility] about to conclude. Will send referral/orders/etc to [facility] cardiac wellness. Has improved in her 5 meter gait speed. Has appt w/ primary cardiologist in 2 weeks. To RTC at one yr anniversary for eval and TTE per TVT Registry Guidelines. 2. Afib - CCB/BB/warfarin. INR management to be returned to [address] [place] [address] as pre-op. 3. CAD s/p CABG and multiple stents - ASA, statin, BB per cards  4. PVD - multiple stents.  ASA/statin per cards  5. HTN - BB/CCB/Loop diuretic   6.  Further care/plan per Dr. Lydia Alonzo

## 2017-07-24 NOTE — MR AVS SNAPSHOT
Visit Information Date & Time Provider Department Dept. Phone Encounter #  
 7/24/2017  1:00 PM Amy Pena MD [facility] [phone] [id number] Upcoming Health Maintenance Date Due DTaP/Tdap/Td series (1 - Tdap) 12/1/1953 ZOSTER VACCINE AGE 60> 10/1/1992 GLAUCOMA SCREENING Q2Y 12/1/1997 OSTEOPOROSIS SCREENING (DEXA) 12/1/1997 MEDICARE YEARLY EXAM 12/1/1997 Pneumococcal 65+ Low/Medium Risk (2 of 2 - PPSV23) 10/19/2010 INFLUENZA AGE 9 TO ADULT 8/1/2017 Allergies as of 7/24/2017  Review Complete On: 7/24/2017 By: Maura Campbell NP Severity Noted Reaction Type Reactions Levofloxacin High 08/19/2011    Swelling Codeine  08/19/2011    Unable to Obtain  
 makes patient cry Contrast Agent [Iodine]  08/19/2011    Unknown (comments) Dr. Yeny Hearn told patient that she is allergic Current Immunizations  Reviewed on 8/22/2011 Name Date Pneumococcal Vaccine (Unspecified Type) 10/19/2005 Not reviewed this visit You Were Diagnosed With   
  
 Codes Comments Aortic valve stenosis, unspecified etiology    -  Primary ICD-10-CM: I35.0 ICD-9-CM: 424.1 S/P TAVR (transcatheter aortic valve replacement)     ICD-10-CM: C51.0 ICD-9-CM: V43.3 Coronary artery disease involving native heart without angina pectoris, unspecified vessel or lesion type     ICD-10-CM: I25.10 ICD-9-CM: 414.01 S/P CABG (coronary artery bypass graft)     ICD-10-CM: Z95.1 ICD-9-CM: V45.81 Hypertension, benign     ICD-10-CM: I10 
ICD-9-CM: 401.1 Hypothyroidism, unspecified type     ICD-10-CM: E03.9 ICD-9-CM: 661. 9 Vitals BP Pulse Temp Resp Height(growth percentile) Weight(growth percentile) 118/56 (BP 1 Location: Left arm, BP Patient Position: Sitting) 64 98.1 °F (36.7 °C) (Oral) 16 5' 6\" (1.676 m) 166 lb 8 oz (75.5 kg) SpO2 BMI Smoking Status 98% 26.87 kg/m2 Never Smoker Vitals History BMI and BSA Data Body Mass Index Body Surface Area  
 26.87 kg/m 2 1.88 m 2 Preferred Pharmacy Pharmacy Name Phone [facility] [address] [place] [address] [phone] Your Updated Medication List  
  
   
This list is accurate as of: 7/24/17  2:31 PM.  Always use your most recent med list.  
  
  
  
  
 aspirin delayed-release 81 mg tablet Take 81 mg by mouth daily. atenolol 25 mg tablet Commonly known as:  TENORMIN Take 25 mg by mouth daily. Biotin 2,500 mcg Cap Take 1 Cap by mouth nightly. cephALEXin 500 mg capsule Commonly known as:  Cheryl Rumple Take 500 mg by mouth three (3) times daily. cholecalciferol 1,000 unit Cap Commonly known as:  VITAMIN D3 Take 1,000 Units by mouth nightly. CRESTOR 10 mg tablet Generic drug:  rosuvastatin Take 40 mg by mouth nightly. cyanocobalamin 500 mcg tablet Commonly known as:  VITAMIN B12 Take 500 mcg by mouth nightly. dilTIAZem  mg Tb24 tablet Commonly known as:  CARDIZEM LA Take 180 mg by mouth nightly. furosemide 20 mg tablet Commonly known as:  LASIX Take  by mouth daily. levothyroxine 125 mcg tablet Commonly known as:  SYNTHROID Take  by mouth Daily (before breakfast). nitroglycerin 0.4 mg SL tablet Commonly known as:  NITROSTAT  
1 Tab by SubLINGual route as needed for Chest Pain.  
  
 potassium chloride SR 10 mEq tablet Commonly known as:  KLOR-CON 10 Take 20 mEq by mouth daily. sertraline 50 mg tablet Commonly known as:  ZOLOFT  
TAKE ONE TABLET BY MOUTH EVERY DAY  
  
 traMADol 50 mg tablet Commonly known as:  ULTRAM  
Take 1 Tab by mouth every six (6) hours as needed. Max Daily Amount: 200 mg.  
  
 TYLENOL 325 mg tablet Generic drug:  acetaminophen Take 650 mg by mouth every four (4) hours as needed for Pain.  
  
 warfarin 7.5 mg tablet Commonly known as:  COUMADIN  
 Take 5 mg by mouth daily. As directed We Performed the Following AMB POC EKG ROUTINE W/ 12 LEADS, INTER & REP [43081 CPT(R)] Introducing [facility] SERVICES! [employer] introduces [employer] patient portal. Now you can access parts of your medical record, email your doctor's office, and request medication refills online. 1. In your internet browser, go to [email or web address] 2. Click on the First Time User? Click Here link in the Sign In box. You will see the New Member Sign Up page. 3. Enter your [employer] Access Code exactly as it appears below. You will not need to use this code after youve completed the sign-up process. If you do not sign up before the expiration date, you must request a new code. · [employer] Access Code: [email or web address] Expires: 8/8/2017  3:29 PM 
 
4. Enter the last four digits of your Social Security Number (xxxx) and Date of Birth (mm/dd/yyyy) as indicated and click Submit. You will be taken to the next sign-up page. 5. Create a [employer] ID. This will be your [employer] login ID and cannot be changed, so think of one that is secure and easy to remember. 6. Create a [employer] password. You can change your password at any time. 7. Enter your Password Reset Question and Answer. This can be used at a later time if you forget your password. 8. Enter your e-mail address. You will receive e-mail notification when new information is available in [address] 9. Click Sign Up. You can now view and download portions of your medical record. 10. Click the Download Summary menu link to download a portable copy of your medical information. If you have questions, please visit the Frequently Asked Questions section of the [employer] website. Remember, [employer] is NOT to be used for urgent needs. For medical emergencies, dial 911. Now available from your iPhone and Android! Please provide this summary of care documentation to your next provider. Your primary care clinician is listed as [facility]. If you have any questions after today's visit, please call [phone].

## 2018-07-13 DIAGNOSIS — Z95.2 S/P TAVR (TRANSCATHETER AORTIC VALVE REPLACEMENT): ICD-10-CM

## 2018-07-13 DIAGNOSIS — I35.0 AORTIC VALVE STENOSIS, UNSPECIFIED ETIOLOGY: Primary | ICD-10-CM

## 2018-07-13 DIAGNOSIS — I10 HYPERTENSION, BENIGN: ICD-10-CM

## 2018-07-13 DIAGNOSIS — Z95.1 S/P CABG (CORONARY ARTERY BYPASS GRAFT): ICD-10-CM

## 2024-01-03 NOTE — PROGRESS NOTES
CSS FLOOR Progress Note    Admit Date: 2017  POD: 3 Days Post-Op      Procedure:  Procedure(s):  TRANSCATHETER AORTIC VALVE REPLACEMENT, TRANSAPICAL APPROACH, 23MM S3 VALVE    Subjective:   Pt seen with Dr. Gera Barahona. Feels better today. Moving more. On 3LNC. Objective:     Visit Vitals    /78 (BP Patient Position: Supine; Post activity)    Pulse 96    Temp 97.8 °F (36.6 °C)    Resp 16    Ht 5' 6\" (1.676 m)    Wt 171 lb 11.8 oz (77.9 kg)    SpO2 100%    Breastfeeding No    BMI 27.72 kg/m2     Temp (24hrs), Av.4 °F (36.9 °C), Min:97.6 °F (36.4 °C), Max:99 °F (37.2 °C)      Last 24hr Input/Output:    Intake/Output Summary (Last 24 hours) at 17 0958  Last data filed at 17 0902   Gross per 24 hour   Intake              630 ml   Output             1100 ml   Net             -470 ml        EKG/Rhythm: A-fib    Oxygen: NC 3L    CXR: IMPRESSION: Left basilar atelectasis and small left pleural effusion. No  pneumothorax. Admission Weight: Last Weight   Weight: 168 lb 10.4 oz (76.5 kg) Weight: 171 lb 11.8 oz (77.9 kg)       EXAM:  General: NAD   Lungs:   Clear to auscultation bilaterally. Incision:  No erythema, drainage or swelling. Heart:  Regular rate and rhythm, S1, S2 normal, no murmur, click, rub or gallop. Abdomen:   Soft, non-tender. Bowel sounds normal. No masses,  No organomegaly. Extremities:  No edema. PPP   Neurologic:  Gross motor and sensory apparatus intact. Activity: ad yaquelin    Diet:  Heart healthy    Lab Data Reviewed:   Recent Labs      17   0400   17   1105   WBC  6.8   < >   --    HGB  9.8*   < >   --    HCT  30.6*   < >   --    PLT  75*   < >   --    NA  137   < >   --    K  4.0   < >   --    BUN  16   < >   --    CREA  0.67   < >   --    GLU  109*   < >   --    GLUCPOC   --    --   147*   INR  1.6*   < >   --     < > = values in this interval not displayed.          Assessment:     Active Problems:    Nonrheumatic aortic valve stenosis sent   (2012)      Aortic stenosis (2017)      S/P TAVR (transcatheter aortic valve replacement) (2017)         Plan/Recommendations/Medical Decision Makin. S/p Transapical TAVR: Stable. On asa. No Plavix (pt already on coumadin for a fib). 2. Atelectasis:  Wean oxygen for 02 sat > 90%. Increase IS and activity as tolerated. On lasix   3. Hx of Chronic Afib: Cont coumadin. Trend INR. On diltiazem. 4. Hx of CVA:  On asa, resume statin. 5. S/p CABG:  On asa, statin. Resume BB when appropriate. 6. Hx of Frequent UTIs: No symptoms currently, monitor  7. Hx of hypothyroid:  On synthroid. 8. HTN: resumed dilt, monitor  9. Dispo: PT/OT. HH vs Rehab. Patient needs to ambulate.     Signed By: NAYELI Hernandez     Saw patient, agree with above  Risk of morbidity and mortality - high  Medical decision making - high complexity

## (undated) DEVICE — DRAIN SURG L3/8-1/2IN DIA3/16IN SIL CARD CONN 1:1 BLAK

## (undated) DEVICE — STERILE POLYISOPRENE POWDER-FREE SURGICAL GLOVES: Brand: PROTEXIS

## (undated) DEVICE — DRAIN SURG 24FR L5/16IN DIA8MM SIL RND HUBLESS FULL FLUT

## (undated) DEVICE — INFECTION CONTROL KIT SYS

## (undated) DEVICE — INTENDED FOR TISSUE SEPARATION, AND OTHER PROCEDURES THAT REQUIRE A SHARP SURGICAL BLADE TO PUNCTURE OR CUT.: Brand: BARD-PARKER ® CARBON RIB-BACK BLADES

## (undated) DEVICE — SOLUTION IV 1000ML 0.9% SOD CHL

## (undated) DEVICE — PRESSURE MONITORING SET: Brand: TRUWAVE

## (undated) DEVICE — Device

## (undated) DEVICE — CATH DIAG IMPLS PIG 6FRX100CM -- IMPULSE 16599-40

## (undated) DEVICE — SYR LR LCK 1ML GRAD NSAF 30ML --

## (undated) DEVICE — SUTURE PERMA-HAND 0 L18IN NONABSORBABLE BLK CT-1 L36MM 1/2 C021D

## (undated) DEVICE — SURGICAL PROCEDURE PACK BASIN MAJ SET CUST NO CAUT

## (undated) DEVICE — STERILE POLYISOPRENE POWDER-FREE SURGICAL GLOVES WITH EMOLLIENT COATING: Brand: PROTEXIS

## (undated) DEVICE — CLIP INT SM WIDE RED TI TRNSVRS GRV CHEVRON SHP W PRECIS

## (undated) DEVICE — DERMABOND SKIN ADH 0.7ML -- DERMABOND ADVANCED 12/BX

## (undated) DEVICE — SPONGE LAP 18X18IN STRL -- 5/PK

## (undated) DEVICE — TRAY CATH 16F URIN MTR LTX -- CONVERT TO ITEM 363111

## (undated) DEVICE — SENSOR TEMP SKIN DISP

## (undated) DEVICE — EXPANSION KT CATH PCA 2.5IN --

## (undated) DEVICE — GRADUATED BOWL: Brand: DEVON

## (undated) DEVICE — 3M™ TEGADERM™ TRANSPARENT FILM DRESSING FRAME STYLE, 1626W, 4 IN X 4-3/4 IN (10 CM X 12 CM), 50/CT 4CT/CASE: Brand: 3M™ TEGADERM™

## (undated) DEVICE — PADDLE DEFIB SZ 2.5IN DIA6.4CM INT SPN W/ SWCH

## (undated) DEVICE — COATED BRAID POLY

## (undated) DEVICE — 72" ARTERIAL PRESSURE TUBING: Brand: ICU MEDICAL

## (undated) DEVICE — ZINACTIVE USE 2641837 CLIP LIG M BLU TI HRT SHP WIRE HORZ 600 PER BX

## (undated) DEVICE — PINNACLE INTRODUCER SHEATH: Brand: PINNACLE

## (undated) DEVICE — CONNECTOR CATH W3/8XH1/2IN STR REDUC TMP

## (undated) DEVICE — (D)PREP SKN CHLRAPRP APPL 26ML -- CONVERT TO ITEM 371833

## (undated) DEVICE — 40418 TRENDELENBURG ONE-STEP ARM PROTECTORS LARGE (1 PAIR): Brand: 40418 TRENDELENBURG ONE-STEP ARM PROTECTORS LARGE (1 PAIR)

## (undated) DEVICE — MICROPUNCTURE INTRODUCER SET SILHOUETTE TRANSITIONLESS WITH STAINLESS STEEL WIRE GUIDE: Brand: MICROPUNCTURE

## (undated) DEVICE — 6 FOOT DISPOSABLE EXTENSION CABLE WITH SAFE CONNECT / SCREW-DOWN

## (undated) DEVICE — AMPLATZ EXTRA STIFF WIRE GUIDE: Brand: AMPLATZ

## (undated) DEVICE — PLEDGET SUT SFT OVL 3 8X5 16IN

## (undated) DEVICE — GOWN,AURORA,FABRIC-REINFORCED,X-LARGE: Brand: MEDLINE

## (undated) DEVICE — REM POLYHESIVE ADULT PATIENT RETURN ELECTRODE: Brand: VALLEYLAB

## (undated) DEVICE — INTENDED TO STANDARDIZE OR CAMERAS TO ALLOW FOR THE USE OF THE OR CAMERA COVER: Brand: ASPEN® O.R. CAMERA COVER

## (undated) DEVICE — CATH DIAG FR4 MPULSE 5FRX100CM -- IMPULSE 16391-02

## (undated) DEVICE — DRAPE XR C ARM 41X74IN LF --

## (undated) DEVICE — DRAPE FLD WRM W44XL66IN C6L FOR INTRATEMP SYS THERMABASIN

## (undated) DEVICE — PACK PROCEDURE SURG HRT CATH

## (undated) DEVICE — WRAP SURG W1.31XL1.34M CARD FOR PT 165-172CM THERMOWRP

## (undated) DEVICE — COVER,TABLE,60X90,STERILE: Brand: MEDLINE